# Patient Record
Sex: MALE | Race: WHITE | Employment: OTHER | URBAN - METROPOLITAN AREA
[De-identification: names, ages, dates, MRNs, and addresses within clinical notes are randomized per-mention and may not be internally consistent; named-entity substitution may affect disease eponyms.]

---

## 2017-02-09 ENCOUNTER — OFFICE VISIT (OUTPATIENT)
Dept: OTOLARYNGOLOGY | Facility: CLINIC | Age: 52
End: 2017-02-09

## 2017-02-09 ENCOUNTER — OFFICE VISIT (OUTPATIENT)
Dept: ORTHOPEDICS | Facility: CLINIC | Age: 52
End: 2017-02-09
Payer: COMMERCIAL

## 2017-02-09 ENCOUNTER — RADIANT APPOINTMENT (OUTPATIENT)
Dept: GENERAL RADIOLOGY | Facility: CLINIC | Age: 52
End: 2017-02-09
Attending: FAMILY MEDICINE
Payer: COMMERCIAL

## 2017-02-09 VITALS — BODY MASS INDEX: 35.93 KG/M2 | WEIGHT: 280 LBS

## 2017-02-09 VITALS — HEIGHT: 74 IN | WEIGHT: 285 LBS | BODY MASS INDEX: 36.57 KG/M2

## 2017-02-09 DIAGNOSIS — M79.644 BILATERAL THUMB PAIN: ICD-10-CM

## 2017-02-09 DIAGNOSIS — M79.644 BILATERAL THUMB PAIN: Primary | ICD-10-CM

## 2017-02-09 DIAGNOSIS — M79.645 BILATERAL THUMB PAIN: ICD-10-CM

## 2017-02-09 DIAGNOSIS — C76.0 ADENOID CYSTIC CARCINOMA OF HEAD AND NECK (H): Primary | ICD-10-CM

## 2017-02-09 DIAGNOSIS — M79.645 BILATERAL THUMB PAIN: Primary | ICD-10-CM

## 2017-02-09 PROCEDURE — 99213 OFFICE O/P EST LOW 20 MIN: CPT | Performed by: FAMILY MEDICINE

## 2017-02-09 PROCEDURE — 73130 X-RAY EXAM OF HAND: CPT | Mod: RT | Performed by: FAMILY MEDICINE

## 2017-02-09 ASSESSMENT — PAIN SCALES - GENERAL
PAINLEVEL: MILD PAIN (3)
PAINLEVEL: NO PAIN (0)

## 2017-02-09 NOTE — PROGRESS NOTES
[facility]   Clinic Visit s Feb 9, 2017    Subjective:  Luiz Ballesteros is a 52 year old male, right hand dominant, who is seen as self referral for evaluation of left thumb pain.    Symptoms began 6 month(s) ago.  Patient describes injury as shocking blast of pain with repetitive motions, as in using on cell phone.  He reports stabbing, radiating and beginning to radiate up the arm.  Right thumb is beginning to have the same symptoms,  pain that is located at the base of the thumb joint; radiation none.  Pain is 9/10 in maximal severity, and 3/10 currently.  Symptoms are generally worse with holding a knife, book, stirring a car; better with not using.  Other treatment so far has consisted of rest with minimal relief.  Associated symptoms: no distal numbness or tingling; denies swelling, redness, or warmth and weakness.  He denies history of related problems  none

## 2017-02-09 NOTE — PROGRESS NOTES
[facility]   Clinic Visit s Feb 9, 2017    Subjective:  Luiz Ballesteros is a 52 year old male, right hand dominant, who is seen as self referral for evaluation of left thumb pain.    Symptoms began 6 month(s) ago.  Patient describes injury as shocking blast of pain with repetitive motions, as in using on cell phone.  He reports stabbing, radiating and beginning to radiate up the arm.  Right thumb is beginning to have the same symptoms,  pain that is located at the base of the thumb joint; radiation none.  Pain is 9/10 in maximal severity, and 3/10 currently.  Symptoms are generally worse with holding a knife, book, starting a car; better with not using, holding a phone and using thumb from same hand on screen duplicates pain.  Other treatment so far has consisted of rest with minimal relief.  Associated symptoms: no distal numbness or tingling; denies swelling, redness, or warmth and weakness.  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 Provider to review       H/O  RD (retinal detachment) 08/18/2014     Priority: Medium     CARDIOVASCULAR SCREENING; LDL GOAL LESS THAN 160 03/06/2012     Priority: Medium     DEVONTE (obstructive sleep apnea) Moderate AHI 21 06/25/2008     Priority: Medium     A polysomnogram on 6/25/08 revealed the following highlights:    Total sleep time 455 minutes, sleep latency 7.5 minutes, REM sleep latency 168.5 minutes. Sleep efficiency was diminished at 78.2%. Sleep architecture was characterized by frequent sleep state changes and arousals. Snoring was documented as moderate to very loud. AHI 21.4, RDI 29.1 with oxygen saturations to 69%. Patient also had 27 central apneas. PLM index 21.8.          Family History   Problem Relation Age of Onset     Glaucoma No family hx of      Macular Degeneration No family hx of      Retinal detachment No family hx of      Amblyopia No family hx of      DIABETES No family hx of      CANCER       aunt- lymphoma       Social History     Social History     Marital Status:      Spouse Name: N/A     Number of Children: N/A     Years of Education: N/A     Social History Main Topics     Smoking status: Never Smoker      Smokeless tobacco: Never Used         Past Surgical History   Procedure Laterality Date     Eye surgery  2005     LE     Eye surgery       Colonoscopy  7/24/14     Colonoscopy  7/24/2014     Procedure: COLONOSCOPY;  Surgeon: Stephen Dempsey MD;  Location:  GI     Cataract iol, rt/lt  2005     RE CE/IOL/SV/PPV     Cataract iol, rt/lt  6/10/2009     LE     Strabismus surgery  2004     LE     Retinal reattachment  2004     retinal tear repair RE     Ent surgery       sinus surgery and septoplasty     Orthopedic surgery       elbow surgery     Optical tracking system endoscopic sinus surgery N/A 1/19/2015     Procedure: OPTICAL TRACKING SYSTEM ENDOSCOPIC SINUS SURGERY;  Surgeon: Raghav Faustin MD;  Location:  OR     Optical tracking system endoscopic endonasal surgery N/A 2/2/2015      "Procedure: OPTICAL TRACKING SYSTEM ENDOSCOPIC ENDONASAL SURGERY;  Surgeon: Raghav Faustin MD;  Location: [facility] OR       Review of Systems   Musculoskeletal: Positive for joint pain.   All other systems reviewed and are negative.        Physical Exam  Ht 6' 2\" (1.88 m)  Wt 285 lb (129.275 kg)  BMI 36.58 kg/m2  Constitutional:well-developed, well-nourished, and in no distress.   Cardiovascular: Intact distal pulses.    Neurological: alert. Gait Normal:   Gait, station, stance, and balance appear normal for age  Skin: Skin is warm and dry.   Psychiatric: Mood and affect normal.   Respiratory: unlabored, speaks in full sentences  Lymph: no LAD, no lymphangitis    Left Hand/Wrist Exam   Sensation: Normal    Tenderness   The patient is experiencing tenderness in the thenar mass.    Range of Motion   Wrist  Pronation:  Normal  Supination: Normal  Flexion:       Normal  Extension:  Normal  Hand  DIP Thumb: Normal  DIP Index:    Normal  DIP Middle:  Normal  DIP Ring:     Normal  DIP Little:     Normal  MP Thumb:  Normal  MP Index:     Normal  MP Middle:   Normal  MP Ring:      Normal  MP Little:      Normal  PIP Index:     Normal  PIP Middle:   Normal  PIP Ring:      Normal  PIP Little:      Normal    Muscle Strength   Wrist Extension:   5/5  Wrist Flexion:       5/5  :                      5/5    Tests   Phalen s Sign: n/t  Tinel s Sign (Medial Nerve): n/t  Finkelstein: Negative    Comments:  Nontender at thumb cmc, + pain with pinching    Right Hand/Wrist Exam   Sensation: Normal    Tenderness   The patient is experiencing tenderness in the thenar area, less than left.    Range of Motion   Wrist  Pronation:  Normal  Supination: Normal  Flexion:      Normal  Extension:  Normal  Hand  DIP Thumb: Normal  DIP Index:    Normal  DIP Middle:  Normal  DIP Ring:     Normal  DIP Little:     Normal  MP Thumb:  Normal  MP Index:     Normal  MP Middle:   Normal  MP Ring:      Normal  MP Little:      Normal  PIP " Index:    Normal  PIP Middle:  Normal  PIP Ring:     Normal  PIP Little:     Normal    Muscle Strength   Wrist Extension:   5/5  Wrist Flexion:       5/5  :                      5/5    Tests   Phalen s Sign: n/t  Tinel s Sign (Medial Nerve): n/t  Finkelstein: Negative    Comments:  Nontender at thumb cmc, no pain with pinching          X-ray images Ordered and independently reviewed by me in the office today with the patient. X-ray shows:   Recent Results (from the past 48 hour(s))   XR Hand Bilateral G/E 3 Views    Narrative    Normal alignment. No fractures, dislocation or lesions. No soft tissue   abnormalities.         ICD-10-CM    1. Bilateral thumb pain M79.645 XR Hand Bilateral G/E 3 Views    M79.644 NIC PT, HAND, AND CHIROPRACTIC REFERRAL     order for DME     CANCELED: DME REFERRAL     Thenar strain primarily from cell phone use. Advised altering phone habits, which he has done to some extent. Given comfort cool CMC brace, which improved symptoms. Will have him see hand therapy for additional ergonomic assistance and strengthening techniques.

## 2017-02-09 NOTE — NURSING NOTE
"Chief Complaint   Patient presents with     Hand Pain     left > right Thumb pain       Initial Ht 6' 2\" (1.88 m)  Wt 285 lb (129.275 kg)  BMI 36.58 kg/m2 Estimated body mass index is 36.58 kg/(m^2) as calculated from the following:    Height as of this encounter: 6' 2\" (1.88 m).    Weight as of this encounter: 285 lb (129.275 kg).  Medication Reconciliation: complete   Minerva Jean ATC      "

## 2017-02-09 NOTE — Clinical Note
Here's an update on your patient, Luiz Ballesteros. Thank you for allowing me at [facility] to be involved in the care of your patient. Please feel free to reach out to me on Service[employer], Epic Staff Message, or by phone at [phone].   Horacio Hobbs MD [facility] [address] , [address] [place] [address] Phone: [phone] Fax: [phone]

## 2017-02-09 NOTE — LETTER
2/9/2017       RE: Luiz Ballesteros  [address] [place] [address]     Dear Colleague,    Thank you for referring your patient, Luiz Ballesteros, to the [facility] EAR NOSE AND THROAT at [facility]. Please see a copy of my visit note below.    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      TREATMENT:  The patient underwent transnasal endoscopic resection on 02/02/2015 followed by postoperative adjuvant proton beam radiation therapy and IMRT at [facility].  The patient finished treatment on 05/20/2015.      HISTORY OF PRESENT ILLNESS:  Mr. Ballesteros is here for sinonasal debridement.  As usual, he has been having some sinonasal congestion and obstruction.      PROCEDURE:  I used a 0-degree nasal endoscope, and I removed abundant sinonasal crusting using suction as well as Yuri forceps.  He tolerated the procedure very well.      PLAN:  Follow up p.r.n.         Again, thank you for allowing me to participate in the care of your patient.      Sincerely,    Raghav Faustin MD

## 2017-02-09 NOTE — PROGRESS NOTES
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      TREATMENT:  The patient underwent transnasal endoscopic resection on 02/02/2015 followed by postoperative adjuvant proton beam radiation therapy and IMRT at [facility].  The patient finished treatment on 05/20/2015.      HISTORY OF PRESENT ILLNESS:  Mr. Ballesteros is here for sinonasal debridement.  As usual, he has been having some sinonasal congestion and obstruction.      PROCEDURE:  I used a 0-degree nasal endoscope, and I removed abundant sinonasal crusting using suction as well as Yuri forceps.  He tolerated the procedure very well.      PLAN:  Follow up p.r.n.

## 2017-02-09 NOTE — MR AVS SNAPSHOT
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    -  1       Follow-ups after your visit        Your next 10 appointments already scheduled     Mar 14, 2017 10:00 AM CDT   VISUAL FIELD with [facility] EYE VISUAL FIELD   Eye Clinic ([facility])    [address]  [address]  [facility] [address] [place] [address]   [phone]            Mar 14, 2017 10:30 AM CDT   RETURN GLAUCOMA with Jennyfer Mccall MD   Eye Clinic ([facility])    [address]  [address]  [facility] [address] [place] [address]   [phone]            Apr 20, 2017  1:20 PM CDT   (Arrive by 1:05 PM)   Return Visit with Raghav Faustin MD   [facility] Ear Nose and Throat ([facility])    [address]  [address]th [facility] [address] [phone]            Apr 20, 2017  2:20 PM CDT   (Arrive by 2:05 PM)   Return Visit with Raghav Faustin MD   [facility] Ear Nose and Throat ([facility])    [address]  [address]th [facility] [id number] [phone]            May 15, 2017  1:00 PM CDT   RETURN RETINA with Lindsey Perez MD   Eye Clinic ([facility])    [address]  [address]  [facility] [address] [place] [address]   [phone]            Aug 10, 2017 10:00 AM CDT   (Arrive by 9:45 AM)   MR ORBIT FACE NECK W/O & W CONTRAST with [address] [facility] ([facility])    [address]  1st Floor  [facility] [id number] [phone]           Take your medicines as usual, unless your doctor tells you  not to. Bring a list of your current medicines to your exam (including vitamins, minerals and over-the-counter drugs).  You will be given intravenous contrast for this exam. To prepare:   The day before your exam, drink extra fluids at least six 8-ounce glasses (unless your doctor tells you to restrict your fluids).   Have a blood test (creatinine test) within 30 days of your exam. Go to your clinic or Diagnostic Imaging Department for this test.  The MRI machine uses a strong magnet. Please wear clothes without metal (snaps, zippers). A sweatsuit works well, or we may give you a hospital gown.  Please remove any body piercings and hair extensions before you arrive. You will also remove watches, jewelry, hairpins, wallets, dentures, partial dental plates and hearing aids. You may wear contact lenses, and you may be able to wear your rings. We have a safe place to keep your personal items, but it is safer to leave them at home.   **IMPORTANT** THE INSTRUCTIONS BELOW ARE ONLY FOR THOSE PATIENTS WHO HAVE BEEN TOLD THEY WILL RECEIVE SEDATION OR GENERAL ANESTHESIA DURING THEIR MRI PROCEDURE:  IF YOU WILL RECEIVE SEDATION (take medicine to help you relax during your exam):   You must get the medicine from your doctor before you arrive. Bring the medicine to the exam. Do not take it at home.   Arrive one hour early. Bring someone who can take you home after the test. Your medicine will make you sleepy. After the exam, you may not drive, take a bus or take a taxi by yourself.   No eating 8 hours before your exam. You may have clear liquids up until 4 hours before your exam. (Clear liquids include water, clear tea, black coffee and fruit juice without pulp.)  IF YOU WILL RECEIVE ANESTHESIA (be asleep for your exam):   Arrive 1 1/2 hours early. Bring someone who can take you home after the test. You may not drive, take a bus or take a taxi by yourself.   No eating 8 hours before your exam. You may have clear liquids up until  4 hours before your exam. (Clear liquids include water, clear tea, black coffee and fruit juice without pulp.)  Please call the Imaging Department at your exam site with any questions.              Who to contact     Please call your clinic at [phone] to:    Ask questions about your health    Make or cancel appointments    Discuss your medicines    Learn about your test results    Speak to your doctor   If you have compliments or concerns about an experience at your clinic, or if you wish to file a complaint, please contact [facility] Patient Relations at [phone] or email us at [email or web address]         Additional Information About Your Visit        Venaxis Information     Venaxis gives you secure access to your electronic health record. If you see a primary care provider, you can also send messages to your care team and make appointments. If you have questions, please call your primary care clinic.  If you do not have a primary care provider, please call [phone] and they will assist you.      Venaxis is an electronic gateway that provides easy, online access to your medical records. With Venaxis, you can request a clinic appointment, read your test results, renew a prescription or communicate with your care team.     To access your existing account, please contact your [facility] or call [phone] for assistance.        Care EveryWhere ID     This is your Care EveryWhere ID. This could be used by other organizations to access your [facility] medical records  [id number]        Your Vitals Were     BMI (Body Mass Index)                   35.95 kg/m2            Blood Pressure from Last 3 Encounters:   12/27/16 128/77   11/16/16 119/60   10/24/16 108/72    Weight from Last 3 Encounters:   02/09/17 127 kg (280 lb)   02/09/17 129.3 kg (285 lb)   12/27/16 134.7 kg (296 lb 15.4 oz)              We Performed the Following      NASAL/SINUS SCOPE WITH BIOPSY/POLYPECT/DEBRIDE,ENT          Today's Medication Changes          These changes are accurate as of: 2/9/17 11:59 PM.  If you have any questions, ask your nurse or doctor.               These medicines have changed or have updated prescriptions.        Dose/Directions    * order for DME   This may have changed:  Another medication with the same name was added. Make sure you understand how and when to take each.   Changed by:  Elizabeth Hawkins MD        Use your CPAP device as directed by your provider.   Refills:  0       * order for DME   This may have changed:  Another medication with the same name was added. Make sure you understand how and when to take each.   Used for:  Achilles tendinitis of both lower extremities, Shin splints, unspecified laterality, initial encounter, Ankle instability, unspecified laterality   Changed by:  Horacio Hobbs MD        Equipment being ordered: double strap ankle brace- XL for right and left ankle   Quantity:  2 Device   Refills:  0       * order for DME   This may have changed:  You were already taking a medication with the same name, and this prescription was added. Make sure you understand how and when to take each.   Used for:  Bilateral thumb pain   Changed by:  Horacio Hobbs MD        Equipment being ordered: CMC brace, LEFT, XL, $28   Quantity:  1 Device   Refills:  0       * Notice:  This list has 3 medication(s) that are the same as other medications prescribed for you. Read the directions carefully, and ask your doctor or other care provider to review them with you.         Where to get your medicines      Some of these will need a paper prescription and others can be bought over the counter.  Ask your nurse if you have questions.     Bring a paper prescription for each of these medications     order for DME                Primary Care Provider Office Phone # Fax #    Christopher Gaytan [phone]  [phone]       [facility] [address] [place] [address]  [facility] [address]        Thank you!     Thank you for choosing [facility] EAR NOSE AND THROAT  for your care. Our goal is always to provide you with excellent care. Hearing back from our patients is one way we can continue to improve our services. Please take a few minutes to complete the written survey that you may receive in the mail after your visit with us. Thank you!             Your Updated Medication List - Protect others around you: Learn how to safely use, store and throw away your medicines at www.disposemymeds.org.          This list is accurate as of: 2/9/17 11:59 PM.  Always use your most recent med list.                   Brand Name Dispense Instructions for use    amLODIPine 10 MG tablet    NORVASC    90 tablet    Take 1 tablet (10 mg) by mouth daily       busPIRone 10 MG tablet    BUSPAR         latanoprost 0.005 % ophthalmic solution    XALATAN    1 Bottle    Place 1 drop into both eyes At Bedtime       lisinopril 40 MG tablet    PRINIVIL/ZESTRIL    90 tablet    Take 1 tablet (40 mg) by mouth daily       methylphenidate 10 MG tablet    RITALIN         ofloxacin 0.3 % ophthalmic solution    OCUFLOX         ofloxacin 0.3 % otic solution    FLOXIN    10 mL    Instill into affected ear/s:  3gtts BID x 7 days       omeprazole 40 MG capsule    priLOSEC    90 capsule    Take 1 capsule (40 mg) by mouth daily       * order for DME      Use your CPAP device as directed by your provider.       * order for DME     2 Device    Equipment being ordered: double strap ankle brace- XL for right and left ankle       * order for DME     1 Device    Equipment being ordered: CMC brace, LEFT, XL, $28       * timolol 0.5 % ophthalmic solution    TIMOPTIC    1 Bottle    Place 1 drop into both eyes daily       * timolol 0.5 % ophthalmic gel-form    TIMOPTIC-XE    5 mL    Place 1 drop into both eyes daily       traZODone 50 MG tablet    DESYREL         *  venlafaxine 150 MG 24 hr capsule    EFFEXOR-XR         * venlafaxine 75 MG 24 hr capsule    EFFEXOR-XR         * Notice:  This list has 7 medication(s) that are the same as other medications prescribed for you. Read the directions carefully, and ask your doctor or other care provider to review them with you.

## 2017-02-09 NOTE — NURSING NOTE
Chief Complaint   Patient presents with     RECHECK     Nasal Cleaning     Shanna Arora Medical Assistant

## 2017-02-14 ENCOUNTER — THERAPY VISIT (OUTPATIENT)
Dept: OCCUPATIONAL THERAPY | Facility: CLINIC | Age: 52
End: 2017-02-14
Payer: COMMERCIAL

## 2017-02-14 DIAGNOSIS — M18.9 CMC ARTHRITIS, THUMB, DEGENERATIVE: Primary | ICD-10-CM

## 2017-02-14 DIAGNOSIS — M79.645 THUMB PAIN, LEFT: ICD-10-CM

## 2017-02-14 PROCEDURE — 97535 SELF CARE MNGMENT TRAINING: CPT | Mod: GO | Performed by: OCCUPATIONAL THERAPIST

## 2017-02-14 PROCEDURE — 97165 OT EVAL LOW COMPLEX 30 MIN: CPT | Mod: GO | Performed by: OCCUPATIONAL THERAPIST

## 2017-02-14 PROCEDURE — 97110 THERAPEUTIC EXERCISES: CPT | Mod: GO | Performed by: OCCUPATIONAL THERAPIST

## 2017-02-14 NOTE — PROGRESS NOTES
Hand Therapy Initial Evaluation    Current Date:  2/14/2017    Diagnosis: L thumb CMC and thenar pain   DOI: August 2016    Subjective:  Luiz Ballesteros is a 52 year old right hand dominant male.    Patient reports symptoms of pain of the left thumb which occurred around the time he was making a cigar box guitar, since then random things trigger it holding book holding steering wheel and phone, opening a door. . Since onset symptoms are Gradually getting worse.  Special tests:  x-ray negative for CMC OA .  Previous treatment: comfort cool, rest .    General health as reported by patient is excellent.  Pertinent medical history includes:cancer ACC, high blood pressure, sleep disorder/apnea  Medical allergies:see chart, late. Surgical history: cancer:B  tennis elbow , orthopedic  Medication history: none.    Current occupation is self employed medical and legal transcription,  Much computer and cell phone usage.  Currently working in normal job without restrictionsNone  Job Tasks: computer work and cell phone   Barriers include:none  Prior functional level: no limitations    Additional Occupational Profile Information (patterns of daily living, interests, values and needs): turning door knobs, holding things, documents books etc     Functional Outcome Measure:  Upper Extremity Functional Index Score:  SCORE:   Column Totals: /80: 57   (A lower score indicates greater disability.)    Objective:  Pain Level Report  VAS(0-10) 2/14/2017   At Rest: 0   With Use: 8     Report of Pain:  Location:  thumb  Pain Quality:  Sharp and Shooting  Frequency: intermittent    Pain is worst:  daytime  Exacerbated by:  Holding   Relieved by:  rest  Progression:  Gradually better.  ROM:wnl      Thumb Observation/Appearance:  Key: + = present/ - = not observed    2/14/2017   Shoulder deformity present over CMC R:+  L:+   Volar subluxation present R:-  L:-   Edema over the CMC joint R:-  L:+ mild    Noted collapse of MP into  hyperextension during pinch R:  L:   Tenderness at CMC R:  L:      Provocative Tests:  Pain Report:  - none    + mild    ++ moderate    +++ severe     2/14/2017   CMC Grind test R:-  L:+   CMC Joint line/pain R:+  L:++   CMC AP joint laxity R:-  L: -   FInkelstein's R:-  L:-   Crepitus present R:-  L:-         Strength:   (Measured in pounds)  Pain Report:  - none    + mild    ++ moderate    +++ severe    2/14/2017    Trials Right  Left    1  2  3 100  110  104 105  105  80   Average: 104 97     3 pt  Pinch 2/14/2017    Trials Right Left    1  2  3 18  18  20 20  20  20   Average: 19 20     lateral Pinch 2/14/2017    Trials Right  Left    1  2  3 24  21  21 23  21  20   Average: 23 21     Assessment:  Patient presents with symptoms consistent with diagnosis of CMC thumb arthrosis and pain , with conservative intervention    Patient s limitations or Problem List includes: Pain, Decreased ROM/motion, weakness, decreased stability of the CMC joint, decreased  and pinch strength of the thumb which interferes with patients ability to perform  Self Care Tasks (dressing), Recreational Activities and Household Chores as compared to previous level of function.    Rehab Potential: Good- Return to full activity, some limitations.    Patient will benefit from skilled Occupational Therapy to increased ROM, flexibility, pinch strength, and stability of the thumb and decrease pain to return to previous activity level and resume normal daily tasks and to reach their rehab potential.    Barriers to Learning:  No barrier    Communication Issues: Patient appears to be able to clearly communicate and understand verbal and written communication and follow directions correctly.    Assessment of Occupational Performance:  1-3 Performance Deficits  Identified Performance Deficits: dressing, hygiene and grooming, home establishment and management and leisure activities      Clinical Decision Making (Complexity): Low  complexity    Treatment Explanations:  The following has been discussed with the patient,  Rx ordered/plan of care  Anticipated outcomes  Possible risks and side effects    Plan:  Frequency:  1 X week, once daily  Duration:  for 6 weeks    Treatment Plan:  Modalities:  Paraffin  Therapeutic Exercise: AROM, Isometrics, and Stabilization exercises of the Thumb CMC, including active and resisted abduction, 1st DI strengthening  Manual Techniques: Joint Mobilization or reseating of the trapezium, self MFR to thumb adductor with clip  Orthosis fabrication:  Hand based Thumb Spica, Custom neoprene support   Education: Anatomy of CMC, joint protection principles, adaptive equipment as needed    Discharge Plan:  Achieve all LTG  [place] in home treatment program.  Reach maximal therapeutic benefit.    Home Program:  1st web release with clip  Adductor stretch  CMC neoprene cool comfort splint during the day with ADL s per symptoms, make sure strap is cradling CMCJ   Incorporate joint protection into daily functional activities  Adaptive equipment as needed.    Next Visit:  Custom L hand CMC support  orthosis  Warmth  Place and hold pinch  Thumb Stabilization Program with hard  C  and index abd  Hand based Thumb Spica orthosis night/sleeping and per symptoms during the day

## 2017-02-14 NOTE — MR AVS SNAPSHOT
After Visit Summary   2/14/2017    Luiz Ballesteros    MRN: [id number]           Patient Information     Date Of Birth          1965        Visit Information        Provider Department      2/14/2017 1:30 PM Noris Carrington OT [facility]        Today's Diagnoses     CMC arthritis, thumb, degenerative    -  1    Thumb pain, left           Follow-ups after your visit        Your next 10 appointments already scheduled     Feb 21, 2017 10:00 AM CST   NIC Hand with [facility] ([facility] [place])    [address] [address]   [phone]            Mar 14, 2017 10:00 AM CDT   VISUAL FIELD with [facility] EYE VISUAL FIELD   Eye Clinic ([facility])    [address]   [facility] [address] [place] [address]   [phone]            Mar 14, 2017 10:30 AM CDT   RETURN GLAUCOMA with Jennyfer Mccall MD   Eye Clinic ([facility])    [address]  [address]   [facility] [address] [place] [address]   [phone]            Apr 20, 2017  1:20 PM CDT   (Arrive by 1:05 PM)   Return Visit with Raghav Faustin MD   [facility] Ear Nose and Throat ([facility])    [address] [address] [phone]            Apr 20, 2017  2:20 PM CDT   (Arrive by 2:05 PM)   Return Visit with Raghav Faustin MD   [facility] Nose [facility]roat ([facility] [place])    [address] [address] [phone]            May 15, 2017  1:00 PM CDT   RETURN RETINA with Lindsey Perez MD   Eye Clinic ([facility])    [address]  [address]   [address] [address] [place] [address]   [phone]            Aug 10, 2017 10:00 AM CDT   (Arrive by 9:45 AM)   MR ORBIT FACE NECK W/O & W CONTRAST with MR1     [facility] ([facility] [place])    [address] [facility]  1st Floor  [facility] [address] [phone]           Take your medicines as usual, unless your doctor tells you not to. Bring a list of your current medicines to your exam (including vitamins, minerals and over-the-counter drugs).  You will be given intravenous contrast for this exam. To prepare:   The day before your exam, drink extra fluids at least six 8-ounce glasses (unless your doctor tells you to restrict your fluids).   Have a blood test (creatinine test) within 30 days of your exam. Go to your clinic or Diagnostic Imaging Department for this test.  The MRI machine uses a strong magnet. Please wear clothes without metal (snaps, zippers). A sweatsuit works well, or we may give you a hospital gown.  Please remove any body piercings and hair extensions before you arrive. You will also remove watches, jewelry, hairpins, wallets, dentures, partial dental plates and hearing aids. You may wear contact lenses, and you may be able to wear your rings. We have a safe place to keep your personal items, but it is safer to leave them at home.   **IMPORTANT** THE INSTRUCTIONS BELOW ARE ONLY FOR THOSE PATIENTS WHO HAVE BEEN TOLD THEY WILL RECEIVE SEDATION OR GENERAL ANESTHESIA DURING THEIR MRI PROCEDURE:  IF YOU WILL RECEIVE SEDATION (take medicine to help you relax during your exam):   You must get the medicine from your doctor before you arrive. Bring the medicine to the exam. Do not take it at home.   Arrive one hour early. Bring someone who can take you home after the test. Your medicine will make you sleepy. After the exam, you may not drive, take a bus or take a taxi by yourself.   No eating 8 hours before your exam. You may have clear liquids up until 4 hours before your exam. (Clear liquids include water, clear tea, black coffee and fruit juice without pulp.)  IF YOU WILL RECEIVE ANESTHESIA (be asleep for your exam):    Arrive 1 1/2 hours early. Bring someone who can take you home after the test. You may not drive, take a bus or take a taxi by yourself.   No eating 8 hours before your exam. You may have clear liquids up until 4 hours before your exam. (Clear liquids include water, clear tea, black coffee and fruit juice without pulp.)  Please call the Imaging Department at your exam site with any questions.              Who to contact     If you have questions or need follow up information about today's clinic visit or your schedule please contact [facility] directly at [phone].  Normal or non-critical lab and imaging results will be communicated to you by MobileVedahart, letter or phone within 4 business days after the clinic has received the results. If you do not hear from us within 7 days, please contact the clinic through [email or web address] or phone. If you have a critical or abnormal lab result, we will notify you by phone as soon as possible.  Submit refill requests through [facility] or call your pharmacy and they will forward the refill request to us. Please allow 3 business days for your refill to be completed.          Additional Information About Your Visit        MyChart Information     [facility] gives you secure access to your electronic health record. If you see a primary care provider, you can also send messages to your care team and make appointments. If you have questions, please call your primary care clinic.  If you do not have a primary care provider, please call [phone] and they will assist you.        Care EveryWhere ID     This is your Care EveryWhere ID. This could be used by other organizations to access your [facility] medical records  [id number]         Blood Pressure from Last 3 Encounters:   12/27/16 128/77   11/16/16 119/60   10/24/16 108/72    Weight from Last 3 Encounters:   02/09/17 127 kg (280 lb)   02/09/17 129.3 kg (285 lb)   12/27/16 134.7 kg (296 lb 15.4 oz)              We Performed the  Following     HC OT EVAL, LOW COMPLEXITY     SELF CARE MNGMENT TRAINING     THERAPEUTIC EXERCISES        Primary Care Provider Office Phone # Fax #    Christopher Gaytan [phone]       [facility] [address] [place] [id number]        Thank you!     Thank you for choosing [facility]  for your care. Our goal is always to provide you with excellent care. Hearing back from our patients is one way we can continue to improve our services. Please take a few minutes to complete the written survey that you may receive in the mail after your visit with us. Thank you!             Your Updated Medication List - Protect others around you: Learn how to safely use, store and throw away your medicines at www.disposemymeds.org.          This list is accurate as of: 2/14/17  8:46 PM.  Always use your most recent med list.                   Brand Name Dispense Instructions for use    amLODIPine 10 MG tablet    NORVASC    90 tablet    Take 1 tablet (10 mg) by mouth daily       busPIRone 10 MG tablet    BUSPAR         latanoprost 0.005 % ophthalmic solution    XALATAN    1 Bottle    Place 1 drop into both eyes At Bedtime       lisinopril 40 MG tablet    PRINIVIL/ZESTRIL    90 tablet    Take 1 tablet (40 mg) by mouth daily       methylphenidate 10 MG tablet    RITALIN         ofloxacin 0.3 % ophthalmic solution    OCUFLOX         ofloxacin 0.3 % otic solution    FLOXIN    10 mL    Instill into affected ear/s:  3gtts BID x 7 days       omeprazole 40 MG capsule    priLOSEC    90 capsule    Take 1 capsule (40 mg) by mouth daily       * order for DME      Use your CPAP device as directed by your provider.       * order for DME     2 Device    Equipment being ordered: double strap ankle brace- XL for right and left ankle       * order for DME     1 Device    Equipment being ordered: CMC brace, LEFT, XL, $28       * timolol 0.5 % ophthalmic solution    TIMOPTIC    1 Bottle    Place 1 drop  into both eyes daily       * timolol 0.5 % ophthalmic gel-form    TIMOPTIC-XE    5 mL    Place 1 drop into both eyes daily       traZODone 50 MG tablet    DESYREL         * venlafaxine 150 MG 24 hr capsule    EFFEXOR-XR         * venlafaxine 75 MG 24 hr capsule    EFFEXOR-XR         * Notice:  This list has 7 medication(s) that are the same as other medications prescribed for you. Read the directions carefully, and ask your doctor or other care provider to review them with you.

## 2017-02-21 ENCOUNTER — THERAPY VISIT (OUTPATIENT)
Dept: OCCUPATIONAL THERAPY | Facility: CLINIC | Age: 52
End: 2017-02-21
Payer: COMMERCIAL

## 2017-02-21 DIAGNOSIS — M79.645 THUMB PAIN, LEFT: ICD-10-CM

## 2017-02-21 DIAGNOSIS — M18.9 CMC ARTHRITIS, THUMB, DEGENERATIVE: ICD-10-CM

## 2017-02-21 PROCEDURE — 29130 APPL FINGER SPLINT STATIC: CPT | Mod: GO | Performed by: OCCUPATIONAL THERAPIST

## 2017-02-21 NOTE — PROGRESS NOTES
SOAP note objective information for 2/21/2017.    Diagnosis: L thumb CMC and thenar pain   DOI: August 2016    Luiz Ballesteros is a 52 year old right hand dominant male.  Patient reports symptoms of pain of the left thumb which occurred around the time he was making a cigar box guitar, since then random things trigger it holding book holding steering wheel and phone, opening a door. . Since onset symptoms are Gradually getting worse.  Special tests:  x-ray negative for CMC OA .  Previous treatment: comfort cool, rest .    General health as reported by patient is excellent.  Pertinent medical history includes:cancer ACC, high blood pressure, sleep disorder/apnea  Medical allergies:see chart, late. Surgical history: cancer:B  tennis elbow , orthopedic  Medication history: none.    Current occupation is self employed medical and legal transcription,  Much computer and cell phone usage.  Currently working in normal job without restrictions  Job Tasks: computer work and cell phone   Barriers include:none  Prior functional level:no limitations    Additional Occupational Profile Information (patterns of daily living, interests, values and needs): turning door knobs, holding things, documents books etc     Functional Outcome Measure:  Upper Extremity Functional Index Score:  SCORE:   Column Totals: /80: 57   (A lower score indicates greater disability.)    Objective:  Pain Level Report  VAS(0-10) 2/14/2017 2/21   At Rest: 0 1-2   With Use: 8 8-9     Report of Pain:  Location:  thumb  Pain Quality:  Sharp and Shooting  Frequency: intermittent    Pain is worst:  daytime  Exacerbated by:  Holding   Relieved by:  rest  Progression:  Gradually better.  ROM:wnl    Thumb Observation/Appearance:  Key: + = present/ - = not observed    2/14/2017   Shoulder deformity present over CMC R:+  L:+   Volar subluxation present R:-  L:-   Edema over the CMC joint R:-  L:+ mild    Noted collapse of MP into hyperextension during pinch  R:  L:   Tenderness at CMC R:  L:      Provocative Tests:  Pain Report:  - none    + mild    ++ moderate    +++ severe     2/14/2017   CMC Grind test R:-  L:+   CMC Joint line/pain R:+  L:++   CMC AP joint laxity R:-  L: -   FInkelstein's R:-  L:-   Crepitus present R:-  L:-         Strength:   (Measured in pounds)  Pain Report:  - none    + mild    ++ moderate    +++ severe    2/14/2017    Trials Right  Left    1  2  3 100  110  104 105  105  80   Average: 104 97     3 pt  Pinch 2/14/2017    Trials Right Left    1  2  3 18  18  20 20  20  20   Average: 19 20     lateral Pinch 2/14/2017    Trials Right  Left    1  2  3 24  21  21 23  21  20   Average: 23 21     Plan:  Frequency:  1 X week, once daily  Duration:  for 6 weeks    Treatment Plan:  Modalities:  Paraffin  Therapeutic Exercise: AROM, Isometrics, and Stabilization exercises of the Thumb CMC, including active and resisted abduction, 1st DI strengthening  Manual Techniques: Joint Mobilization or reseating of the trapezium, self MFR to thumb adductor with clip  Orthosis fabrication:  Hand based Thumb Spica, Custom neoprene support   Education: Anatomy of CMC, joint protection principles, adaptive equipment as needed    Home Program:  1st web release with clip  Adductor stretch  CMC neoprene cool comfort splint during the day with ADL s per symptoms, make sure strap is cradling CMCJ   Incorporate joint protection into daily functional activities  Adaptive equipment as needed.  Custom L hand CMC support  Orthosis    Next Visit:  How was orthosis:? Vs comfort cool?  Warmth  Place and hold pinch  Thumb Stabilization Program with hard  C  and index abd

## 2017-02-21 NOTE — MR AVS SNAPSHOT
After Visit Summary   2/21/2017    Luiz Ballesteros    MRN: [id number]           Patient Information     Date Of Birth          1965        Visit Information        Provider Department      2/21/2017 10:00 AM Noris Carrington OT [facility]        Today's Diagnoses     CMC arthritis, thumb, degenerative        Thumb pain, left           Follow-ups after your visit        Your next 10 appointments already scheduled     Feb 28, 2017 10:00 AM CST   NIC Hand with [facility] ([facility])    [address] [address]   [phone]            Mar 14, 2017 10:00 AM CDT   VISUAL FIELD with [facility] EYE VISUAL FIELD   Eye Clinic ([facility])    [address]  [address] Clin [address] [place] [address]   [phone]            Mar 14, 2017 10:30 AM CDT   RETURN GLAUCOMA with Jennyfer Mccall MD   Eye Clinic ([facility])    [address]  [address] Clin [address] [place] [address]   [phone]            Apr 20, 2017  1:20 PM CDT   (Arrive by 1:05 PM)   Return Visit with Raghav Faustin MD   [facility] ([facility])    [address] [place] [id number] [phone]            Apr 20, 2017  2:20 PM CDT   (Arrive by 2:05 PM)   Return Visit with Raghav Faustin MD   [facility] ([facility])    [address] [place] [id number] [phone]            May 15, 2017  1:00 PM CDT   RETURN RETINA with Lindsey Perez MD   Eye Clinic ([facility])    [address]  [address]   [facility] Clin [address] [place] [address]   [phone]            Aug 10, 2017 10:00 AM CDT   (Arrive by 9:45 AM)   MR ORBIT FACE NECK W/O & W CONTRAST with UCMR1   M  [facility] ([facility])    [address] [facility]  1st Floor  [facility] [address] [phone]           Take your medicines as usual, unless your doctor tells you not to. Bring a list of your current medicines to your exam (including vitamins, minerals and over-the-counter drugs).  You will be given intravenous contrast for this exam. To prepare:   The day before your exam, drink extra fluids at least six 8-ounce glasses (unless your doctor tells you to restrict your fluids).   Have a blood test (creatinine test) within 30 days of your exam. Go to your clinic or Diagnostic Imaging Department for this test.  The MRI machine uses a strong magnet. Please wear clothes without metal (snaps, zippers). A sweatsuit works well, or we may give you a hospital gown.  Please remove any body piercings and hair extensions before you arrive. You will also remove watches, jewelry, hairpins, wallets, dentures, partial dental plates and hearing aids. You may wear contact lenses, and you may be able to wear your rings. We have a safe place to keep your personal items, but it is safer to leave them at home.   **IMPORTANT** THE INSTRUCTIONS BELOW ARE ONLY FOR THOSE PATIENTS WHO HAVE BEEN TOLD THEY WILL RECEIVE SEDATION OR GENERAL ANESTHESIA DURING THEIR MRI PROCEDURE:  IF YOU WILL RECEIVE SEDATION (take medicine to help you relax during your exam):   You must get the medicine from your doctor before you arrive. Bring the medicine to the exam. Do not take it at home.   Arrive one hour early. Bring someone who can take you home after the test. Your medicine will make you sleepy. After the exam, you may not drive, take a bus or take a taxi by yourself.   No eating 8 hours before your exam. You may have clear liquids up until 4 hours before your exam. (Clear liquids include water, clear tea, black coffee and fruit juice without pulp.)  IF YOU WILL RECEIVE ANESTHESIA (be asleep for your exam):    Arrive 1 1/2 hours early. Bring someone who can take you home after the test. You may not drive, take a bus or take a taxi by yourself.   No eating 8 hours before your exam. You may have clear liquids up until 4 hours before your exam. (Clear liquids include water, clear tea, black coffee and fruit juice without pulp.)  Please call the Imaging Department at your exam site with any questions.              Who to contact     If you have questions or need follow up information about today's clinic visit or your schedule please contact "[employer] directly at [phone].  Normal or non-critical lab and imaging results will be communicated to you by O-filmhart, letter or phone within 4 business days after the clinic has received the results. If you do not hear from us within 7 days, please contact the clinic through [employer] or phone. If you have a critical or abnormal lab result, we will notify you by phone as soon as possible.  Submit refill requests through [employer] or call your pharmacy and they will forward the refill request to us. Please allow 3 business days for your refill to be completed.          Additional Information About Your Visit        MyChart Information     [employer] gives you secure access to your electronic health record. If you see a primary care provider, you can also send messages to your care team and make appointments. If you have questions, please call your primary care clinic.  If you do not have a primary care provider, please call [phone] and they will assist you.        Care EveryWhere ID     This is your Care EveryWhere ID. This could be used by other organizations to access your [place] medical records  [id number]         Blood Pressure from Last 3 Encounters:   12/27/16 128/77   11/16/16 119/60   10/24/16 108/72    Weight from Last 3 Encounters:   02/09/17 127 kg (280 lb)   02/09/17 129.3 kg (285 lb)   12/27/16 134.7 kg (296 lb 15.4 oz)              We Performed the  Following     APPLY FINGER SPLINT STATIC        Primary Care Provider Office Phone # Fax #    Christopher Gaytan [phone]       [facility] [address] [place] [address]        Thank you!     Thank you for choosing [facility]  for your care. Our goal is always to provide you with excellent care. Hearing back from our patients is one way we can continue to improve our services. Please take a few minutes to complete the written survey that you may receive in the mail after your visit with us. Thank you!             Your Updated Medication List - Protect others around you: Learn how to safely use, store and throw away your medicines at www.disposemymeds.org.          This list is accurate as of: 2/21/17 10:10 PM.  Always use your most recent med list.                   Brand Name Dispense Instructions for use    amLODIPine 10 MG tablet    NORVASC    90 tablet    Take 1 tablet (10 mg) by mouth daily       busPIRone 10 MG tablet    BUSPAR         latanoprost 0.005 % ophthalmic solution    XALATAN    1 Bottle    Place 1 drop into both eyes At Bedtime       lisinopril 40 MG tablet    PRINIVIL/ZESTRIL    90 tablet    Take 1 tablet (40 mg) by mouth daily       methylphenidate 10 MG tablet    RITALIN         ofloxacin 0.3 % ophthalmic solution    OCUFLOX         ofloxacin 0.3 % otic solution    FLOXIN    10 mL    Instill into affected ear/s:  3gtts BID x 7 days       omeprazole 40 MG capsule    priLOSEC    90 capsule    Take 1 capsule (40 mg) by mouth daily       * order for DME      Use your CPAP device as directed by your provider.       * order for DME     2 Device    Equipment being ordered: double strap ankle brace- XL for right and left ankle       * order for DME     1 Device    Equipment being ordered: CMC brace, LEFT, XL, $28       * timolol 0.5 % ophthalmic solution    TIMOPTIC    1 Bottle    Place 1 drop into both eyes daily       * timolol 0.5 % ophthalmic  gel-form    TIMOPTIC-XE    5 mL    Place 1 drop into both eyes daily       traZODone 50 MG tablet    DESYREL         * venlafaxine 150 MG 24 hr capsule    EFFEXOR-XR         * venlafaxine 75 MG 24 hr capsule    EFFEXOR-XR         * Notice:  This list has 7 medication(s) that are the same as other medications prescribed for you. Read the directions carefully, and ask your doctor or other care provider to review them with you.

## 2017-02-28 ENCOUNTER — THERAPY VISIT (OUTPATIENT)
Dept: OCCUPATIONAL THERAPY | Facility: CLINIC | Age: 52
End: 2017-02-28
Payer: COMMERCIAL

## 2017-02-28 DIAGNOSIS — M79.645 THUMB PAIN, LEFT: ICD-10-CM

## 2017-02-28 PROCEDURE — 97140 MANUAL THERAPY 1/> REGIONS: CPT | Mod: GO | Performed by: OCCUPATIONAL THERAPIST

## 2017-02-28 PROCEDURE — 97110 THERAPEUTIC EXERCISES: CPT | Mod: GO | Performed by: OCCUPATIONAL THERAPIST

## 2017-02-28 NOTE — MR AVS SNAPSHOT
After Visit Summary   2/28/2017    Luiz Ballesteros    MRN: [id number]           Patient Information     Date Of Birth          1965        Visit Information        Provider Department      2/28/2017 10:00 AM Noris Carrington OT [facility]        Today's Diagnoses     Thumb pain, left           Follow-ups after your visit        Your next 10 appointments already scheduled     Mar 14, 2017 10:00 AM CDT   VISUAL FIELD with [facility] EYE VISUAL FIELD   Eye Clinic ([facility])    [address]  [address] [address]   [phone]            Mar 14, 2017 10:30 AM CDT   RETURN GLAUCOMA with Jennyfer Mccall MD   Eye Clinic ([facility])    [address]  [address] [address]   [phone]            Mar 21, 2017 10:00 AM CDT   NIC Hand with [facility] ([facility])    [address] [facility]  4th [facility] [address] [phone]            Apr 20, 2017  2:20 PM CDT   (Arrive by 2:05 PM)   Return Visit with Raghav Faustin MD   [facility] Nose and Th[facility]at ([facility])    [address] [place] [address] [phone]            May 15, 2017  1:00 PM CDT   RETURN RETINA with Lindsey Perez MD   Eye Clinic ([facility])    [address]  [address] [address]   [phone]            Aug 10, 2017 10:00 AM CDT   (Arrive by 9:45 AM)   MR ORBIT FACE NECK W/O & W CONTRAST with [address] [facility] ([facility])    [address] [address] [phone]           Take your medicines as usual, unless your doctor tells you not to. Bring a list of your current medicines to your exam (including vitamins, minerals and  over-the-counter drugs).  You will be given intravenous contrast for this exam. To prepare:   The day before your exam, drink extra fluids at least six 8-ounce glasses (unless your doctor tells you to restrict your fluids).   Have a blood test (creatinine test) within 30 days of your exam. Go to your clinic or Diagnostic Imaging Department for this test.  The MRI machine uses a strong magnet. Please wear clothes without metal (snaps, zippers). A sweatsuit works well, or we may give you a hospital gown.  Please remove any body piercings and hair extensions before you arrive. You will also remove watches, jewelry, hairpins, wallets, dentures, partial dental plates and hearing aids. You may wear contact lenses, and you may be able to wear your rings. We have a safe place to keep your personal items, but it is safer to leave them at home.   **IMPORTANT** THE INSTRUCTIONS BELOW ARE ONLY FOR THOSE PATIENTS WHO HAVE BEEN TOLD THEY WILL RECEIVE SEDATION OR GENERAL ANESTHESIA DURING THEIR MRI PROCEDURE:  IF YOU WILL RECEIVE SEDATION (take medicine to help you relax during your exam):   You must get the medicine from your doctor before you arrive. Bring the medicine to the exam. Do not take it at home.   Arrive one hour early. Bring someone who can take you home after the test. Your medicine will make you sleepy. After the exam, you may not drive, take a bus or take a taxi by yourself.   No eating 8 hours before your exam. You may have clear liquids up until 4 hours before your exam. (Clear liquids include water, clear tea, black coffee and fruit juice without pulp.)  IF YOU WILL RECEIVE ANESTHESIA (be asleep for your exam):   Arrive 1 1/2 hours early. Bring someone who can take you home after the test. You may not drive, take a bus or take a taxi by yourself.   No eating 8 hours before your exam. You may have clear liquids up until 4 hours before your exam. (Clear liquids include water, clear tea, black coffee and fruit  juice without pulp.)  Please call the Imaging Department at your exam site with any questions.              Who to contact     If you have questions or need follow up information about today's clinic visit or your schedule please contact [facility] directly at [phone].  Normal or non-critical lab and imaging results will be communicated to you by [employer], letter or phone within 4 business days after the clinic has received the results. If you do not hear from us within 7 days, please contact the clinic through [employer] or phone. If you have a critical or abnormal lab result, we will notify you by phone as soon as possible.  Submit refill requests through [employer] or call your pharmacy and they will forward the refill request to us. Please allow 3 business days for your refill to be completed.          Additional Information About Your Visit        [employer] Information     [employer] gives you secure access to your electronic health record. If you see a primary care provider, you can also send messages to your care team and make appointments. If you have questions, please call your primary care clinic.  If you do not have a primary care provider, please call [phone] and they will assist you.        Care EveryWhere ID     This is your Care EveryWhere ID. This could be used by other organizations to access your [place] medical records  [id number]         Blood Pressure from Last 3 Encounters:   12/27/16 128/77   11/16/16 119/60   10/24/16 108/72    Weight from Last 3 Encounters:   02/09/17 127 kg (280 lb)   02/09/17 129.3 kg (285 lb)   12/27/16 134.7 kg (296 lb 15.4 oz)              We Performed the Following     MANUAL THER TECH,1+REGIONS,EA 15 MIN     THERAPEUTIC EXERCISES        Primary Care Provider Office Phone # Fax #    Christopher Gaytan [phone]       PRIMARY [facility]ARE CENTER [address] [place] [id number]        Thank you!     Thank you for choosing [facility]  for your care. Our goal is always to provide you with excellent care. Hearing back from our patients is one way we can continue to improve our services. Please take a few minutes to complete the written survey that you may receive in the mail after your visit with us. Thank you!             Your Updated Medication List - Protect others around you: Learn how to safely use, store and throw away your medicines at www.disposemymeds.org.          This list is accurate as of: 2/28/17 11:55 PM.  Always use your most recent med list.                   Brand Name Dispense Instructions for use    amLODIPine 10 MG tablet    NORVASC    90 tablet    Take 1 tablet (10 mg) by mouth daily       busPIRone 10 MG tablet    BUSPAR         latanoprost 0.005 % ophthalmic solution    XALATAN    1 Bottle    Place 1 drop into both eyes At Bedtime       lisinopril 40 MG tablet    PRINIVIL/ZESTRIL    90 tablet    Take 1 tablet (40 mg) by mouth daily       methylphenidate 10 MG tablet    RITALIN         ofloxacin 0.3 % ophthalmic solution    OCUFLOX         ofloxacin 0.3 % otic solution    FLOXIN    10 mL    Instill into affected ear/s:  3gtts BID x 7 days       omeprazole 40 MG capsule    priLOSEC    90 capsule    Take 1 capsule (40 mg) by mouth daily       * order for DME      Use your CPAP device as directed by your provider.       * order for DME     2 Device    Equipment being ordered: double strap ankle brace- XL for right and left ankle       * order for DME     1 Device    Equipment being ordered: CMC brace, LEFT, XL, $28       * timolol 0.5 % ophthalmic solution    TIMOPTIC    1 Bottle    Place 1 drop into both eyes daily       * timolol 0.5 % ophthalmic gel-form    TIMOPTIC-XE    5 mL    Place 1 drop into both eyes daily       traZODone 50 MG tablet    DESYREL         * venlafaxine 150 MG 24 hr capsule    EFFEXOR-XR         * venlafaxine 75 MG 24 hr capsule    EFFEXOR-XR         * Notice:  This list has 7 medication(s) that  are the same as other medications prescribed for you. Read the directions carefully, and ask your doctor or other care provider to review them with you.

## 2017-02-28 NOTE — PROGRESS NOTES
SOAP note objective information for 2/28/2017.    Diagnosis: L thumb CMC and thenar pain   DOI: August 2016    Luiz Ballesteros is a 52 year old right hand dominant male.  Patient reports symptoms of pain of the left thumb which occurred around the time he was making a cigar box guitar, since then random things trigger it holding book holding steering wheel and phone, opening a door. . Since onset symptoms are Gradually getting worse.  Special tests:  x-ray negative for CMC OA .  Previous treatment: comfort cool, rest .    General health as reported by patient is excellent.  Pertinent medical history includes:cancer ACC, high blood pressure, sleep disorder/apnea  Medical allergies:see chart, late. Surgical history: cancer:B  tennis elbow , orthopedic  Medication history: none.    Current occupation is self employed medical and legal transcription,  Much computer and cell phone usage.[employer]   Currently working in normal job without restrictions  Job Tasks: computer work and cell phone   Barriers include:none  Prior functional level:no limitations    Additional Occupational Profile Information (patterns of daily living, interests, values and needs): turning door knobs, holding things, documents books etc     Objective:  Pain Level Report  VAS(0-10) 2/14/2017 2/21 2/28   At Rest: 0 1-2 0-2   With Use: 8 8-9 6-8     Report of Pain:  Location:  thumb  Pain Quality:  Sharp and Shooting  Frequency: intermittent    Pain is worst:  daytime  Exacerbated by:  Holding   Relieved by:  rest  Progression:  Gradually better.  ROM:wnl    Thumb Observation/Appearance:  Key: + = present/ - = not observed    2/14/2017   Shoulder deformity present over CMC R:+  L:+   Volar subluxation present R:-  L:-   Edema over the CMC joint R:-  L:+ mild    Noted collapse of MP into hyperextension during pinch R:  L:   Tenderness at CMC R:  L:      Provocative Tests:  Pain Report:  - none    + mild    ++ moderate    +++ severe      2/14/2017   CMC Grind test R:-  L:+   CMC Joint line/pain R:+  L:++   CMC AP joint laxity R:-  L: -   FInkelstein's R:-  L:-   Crepitus present R:-  L:-         Strength:   (Measured in pounds)  Pain Report:  - none    + mild    ++ moderate    +++ severe    2/14/2017    Trials Right  Left    1  2  3 100  110  104 105  105  80   Average: 104 97     3 pt  Pinch 2/14/2017    Trials Right Left    1  2  3 18  18  20 20  20  20   Average: 19 20     lateral Pinch 2/14/2017    Trials Right  Left    1  2  3 24  21  21 23  21  20   Average: 23 21     Plan:  Frequency:  1 X week, once daily  Duration:  for 6 weeks    Treatment Plan:  Modalities:  Paraffin  Therapeutic Exercise: AROM, Isometrics, and Stabilization exercises of the Thumb CMC, including active and resisted abduction, 1st DI strengthening  Manual Techniques: Joint Mobilization or reseating of the trapezium, self MFR to thumb adductor with clip  Orthosis fabrication:  Hand based Thumb Spica, Custom neoprene support   Education: Anatomy of CMC, joint protection principles, adaptive equipment as needed    Home Program:  1st web release with clip  Adductor stretch  CMC neoprene cool comfort splint during the day with ADL s per symptoms, make sure strap is cradling CMCJ   Incorporate joint protection into daily functional activities  Adaptive equipment as needed.  Custom L hand CMC support  Orthosis    Next Visit:  Check response to stabilization exercises   integrated function and   Warmth

## 2017-03-13 ENCOUNTER — TELEPHONE (OUTPATIENT)
Dept: OTOLARYNGOLOGY | Facility: CLINIC | Age: 52
End: 2017-03-13

## 2017-03-13 DIAGNOSIS — H40.1190 POAG (PRIMARY OPEN-ANGLE GLAUCOMA): Primary | ICD-10-CM

## 2017-03-13 NOTE — TELEPHONE ENCOUNTER
Patient called wanting to speak with Dr. Nissen's nurse. He states he was swimming five days ago and since then has had otorrhea from his right ear. He states the drainage is clear but thick. He denies otalgia and is afebrile, but is concerned that the ear is still draining. He would like Dr. Nissen to take a look at it and is here at [facility] for his glaucoma screening tomorrow at 1000(two hour appointment.) Ok to add to Dr. Nissen's schedule at 1300. Msg sent to Scheduling.    Lisa Marquez, RN Care Coordinator  [facility] Otol[facility]ology/Head & Neck Surgery  Direct contact: [phone]

## 2017-03-14 ENCOUNTER — OFFICE VISIT (OUTPATIENT)
Dept: OTOLARYNGOLOGY | Facility: CLINIC | Age: 52
End: 2017-03-14

## 2017-03-14 VITALS — WEIGHT: 280 LBS | BODY MASS INDEX: 35.94 KG/M2 | HEIGHT: 74 IN

## 2017-03-14 DIAGNOSIS — H92.11 OTORRHEA OF RIGHT EAR: Primary | ICD-10-CM

## 2017-03-14 RX ORDER — OFLOXACIN 3 MG/ML
SOLUTION AURICULAR (OTIC)
Qty: 10 ML | Refills: 0 | Status: SHIPPED | OUTPATIENT
Start: 2017-03-14

## 2017-03-14 ASSESSMENT — PAIN SCALES - GENERAL: PAINLEVEL: MILD PAIN (3)

## 2017-03-14 NOTE — PROGRESS NOTES
Dear Christopher Adames:    I had the pleasure of seeing Luiz Ballesteros in followup today at the [facility].    HISTORY OF PRESENT ILLNESS: The patient is a 52-year-old in today for followup from his last visit in November of 2016.  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  He had a right tube placed initially in November, 2015 followed by a T-tube placed in March of 2016.  At his last visit, he had some irritation in the ear with a polyp present which cleared using drops.  He has been doing well.  Was recently in [place] and swam for five days without earplugs.  He forgot the plugs but went ahead and swam.  He says the ear feels plugged on the right with some moisture and a little bit of odor.  Otherwise, he has been doing well.           MEDICATIONS: Please refer to the detailed medication reconciliation performed by my nurse today, which I have reviewed and signed.     ALLERGIES:    Allergies   Allergen Reactions     Augmentin Rash     Clavulanic Acid      augmentin, but pt has no reaction to just penicillin     Sulfa Drugs Swelling       HABITS:   Alcohol use No    History   Smoking Status     Never Smoker   Smokeless Tobacco     Never Used         PAST MEDICAL HISTORY:  Please see today's intake form (for the remainder of the PMH) which I reviewed and signed.  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, N0, M0     Gastro-oesophageal reflux disease      Glaucoma      early OAG     Hypertension      Nonsenile cataract      DEVONTE (obstructive sleep apnea) 2008     Retinal detachment      BE     Strabismus        FAMILY HISTORY/SOCIAL HISTORY:    Family History   Problem Relation Age of Onset     CANCER Other      aunt- lymphoma     Glaucoma No family hx of      Macular Degeneration No  family hx of      Retinal detachment No family hx of      Amblyopia No family hx of      DIABETES No family hx of     Social History     Social History     Marital status:      Spouse name: N/A     Number of children: N/A     Years of education: N/A     Occupational History     Not on file.     Social History Main Topics     Smoking status: Never Smoker     Smokeless tobacco: Never Used     Alcohol use No     Drug use: No     Sexual activity: Not on file     Other Topics Concern     Not on file     Social History Narrative       REVIEW OF SYSTEMS: Please see today's intake form (for the remainder of the ROS) which I have reviewed and signed.    PHYSICIAL EXAMINATION:  Constitutional: The patient was well-groomed and in no acute distress.   Skin: Warm and pink.  Psychiatric: The patient's affect was calm, cooperative, and appropriate.   Respiratory: Breathing comfortably without stridor or exertion of accessory muscles.  Eyes: Pupils were equal and reactive. Extraocular movement intact.   Head: Normocephalic and atraumatic. No lesions or scars.  Ears: On exam he does have debris within the right ear canal with some drainage so was placed under the microscope.  Under high-powered magnification, the right side was cleaned with suction tips.  Following cleaning, the tube looks to be in place and patent.  Drops were placed.  Left side looks like the tympanic membrane is intact with no concerns or problems after cleaning using similar techniques.     Nose: Sinuses were nontender. Anterior rhinoscopy revealed midline septum and absence of purulence or polyps.  Oral Cavity: Normal tongue, floor of moth, buccal mucosa, and palate. No abnormal lymph tissue in the oropharynx.   Neck: The parotid is soft without masses. Supple with normal laryngeal and tracheal landmarks.   Lymphatic: There is no palpable lymphadenopathy or other masses in the neck.   Neurologic: Alert and oriented x 3. Cranial nerves III-XI within  normal limits. Voice quality normal.  Cerebellar Function Tests:  Grossly normal    Audiogram:  None today.  Tuning forks show positive responses.         IMPRESSION AND PLAN: I am going to put him on some Floxin otic drops three times a day for a week and then twice a day for a week.  I will see him back if that does not clear or any problems persist; otherwise we will follow up in a year.         Thank you very much for the opportunity to participate in the care of your patient.    Rick L Nissen MD

## 2017-03-14 NOTE — MR AVS SNAPSHOT
After Visit Summary   3/14/2017    Luiz Ballesteros    MRN: [id number]           Patient Information     Date Of Birth          1965        Visit Information        Provider Department      3/14/2017 1:00 PM Nissen, Rick L, MD [facility]        Today's Diagnoses     Otorrhea of right ear    -  1       Follow-ups after your visit        Your next 10 appointments already scheduled     Mar 21, 2017  9:00 AM CDT   VISUAL FIELD with [facility] EYE VISUAL FIELD   Eye Clinic ([facility])    [address] [address]   [phone]            Mar 21, 2017  9:30 AM CDT   RETURN GLAUCOMA with Jennyfer Mccall MD   Eye Clinic ([facility])    [address]  [address] [address]   [phone]            Mar 21, 2017  4:00 PM CDT   NIC Hand with Noris Carrington OT   [facility] ([facility])    [address] [place] [id number] [phone]            Apr 20, 2017  2:20 PM CDT   (Arrive by 2:05 PM)   Return Visit with Raghav Faustin MD   [facility] ([facility])    [address] [place] [id number] [phone]            May 15, 2017  1:00 PM CDT   RETURN RETINA with Lindsey Perez MD   Eye Clinic ([facility])    [address]  [address] [place] [address]   [phone]            Aug 10, 2017 10:00 AM CDT   (Arrive by 9:45 AM)   MR ORBIT FACE NECK W/O & W CONTRAST with [facility] ([facility])    [address] [place] [id number] [phone]           Take your medicines as usual, unless your doctor tells you not to. Bring a list of your current medicines to your exam (including  vitamins, minerals and over-the-counter drugs).  You will be given intravenous contrast for this exam. To prepare:   The day before your exam, drink extra fluids at least six 8-ounce glasses (unless your doctor tells you to restrict your fluids).   Have a blood test (creatinine test) within 30 days of your exam. Go to your clinic or Diagnostic Imaging Department for this test.  The MRI machine uses a strong magnet. Please wear clothes without metal (snaps, zippers). A sweatsuit works well, or we may give you a hospital gown.  Please remove any body piercings and hair extensions before you arrive. You will also remove watches, jewelry, hairpins, wallets, dentures, partial dental plates and hearing aids. You may wear contact lenses, and you may be able to wear your rings. We have a safe place to keep your personal items, but it is safer to leave them at home.   **IMPORTANT** THE INSTRUCTIONS BELOW ARE ONLY FOR THOSE PATIENTS WHO HAVE BEEN TOLD THEY WILL RECEIVE SEDATION OR GENERAL ANESTHESIA DURING THEIR MRI PROCEDURE:  IF YOU WILL RECEIVE SEDATION (take medicine to help you relax during your exam):   You must get the medicine from your doctor before you arrive. Bring the medicine to the exam. Do not take it at home.   Arrive one hour early. Bring someone who can take you home after the test. Your medicine will make you sleepy. After the exam, you may not drive, take a bus or take a taxi by yourself.   No eating 8 hours before your exam. You may have clear liquids up until 4 hours before your exam. (Clear liquids include water, clear tea, black coffee and fruit juice without pulp.)  IF YOU WILL RECEIVE ANESTHESIA (be asleep for your exam):   Arrive 1 1/2 hours early. Bring someone who can take you home after the test. You may not drive, take a bus or take a taxi by yourself.   No eating 8 hours before your exam. You may have clear liquids up until 4 hours before your exam. (Clear liquids include water, clear tea,  "black coffee and fruit juice without pulp.)  Please call the Imaging Department at your exam site with any questions.              Who to contact     Please call your clinic at [phone] to:    Ask questions about your health    Make or cancel appointments    Discuss your medicines    Learn about your test results    Speak to your doctor   If you have compliments or concerns about an experience at your clinic, or if you wish to file a complaint, please contact [facility] Patient Relations at [phone] or email us at [email or web address]         Additional Information About Your Visit        OmnidroneharPopular Pays Information     [employer] gives you secure access to your electronic health record. If you see a primary care provider, you can also send messages to your care team and make appointments. If you have questions, please call your primary care clinic.  If you do not have a primary care provider, please call [phone] and they will assist you.      [employer] is an electronic gateway that provides easy, online access to your medical records. With [employer], you can request a clinic appointment, read your test results, renew a prescription or communicate with your care team.     To access your existing account, please contact your [facility] Clinic or call [phone] for assistance.        Care EveryWhere ID     This is your Care EveryWhere ID. This could be used by other organizations to access your [place] medical records  [id number]        Your Vitals Were     Height BMI (Body Mass Index)                1.88 m (6' 2.02\") 35.93 kg/m2           Blood Pressure from Last 3 Encounters:   12/27/16 128/77   11/16/16 119/60   10/24/16 108/72    Weight from Last 3 Encounters:   03/14/17 127 kg (280 lb)   02/09/17 127 kg (280 lb)   02/09/17 129.3 kg (285 lb)              We Performed the Following     BINOCULAR MICROSCOPY          Today's Medication Changes        "   These changes are accurate as of: 3/14/17 11:59 PM.  If you have any questions, ask your nurse or doctor.               These medicines have changed or have updated prescriptions.        Dose/Directions    * ofloxacin 0.3 % otic solution   Commonly known as:  FLOXIN   This may have changed:  Another medication with the same name was added. Make sure you understand how and when to take each.   Used for:  Ear pain, unspecified laterality   Changed by:  Nissen, Rick L, MD        Instill into affected ear/s:  3gtts BID x 7 days   Quantity:  10 mL   Refills:  0       * ofloxacin 0.3 % otic solution   Commonly known as:  FLOXIN   This may have changed:  You were already taking a medication with the same name, and this prescription was added. Make sure you understand how and when to take each.   Used for:  Otorrhea of right ear   Changed by:  Nissen, Rick L, MD        Place 3gtts into affected ear TID x 7 days, then BID x 7 days, then DAILY x 7 days, then stop   Quantity:  10 mL   Refills:  0       * Notice:  This list has 2 medication(s) that are the same as other medications prescribed for you. Read the directions carefully, and ask your doctor or other care provider to review them with you.         Where to get your medicines      These medications were sent to [place] [id number] [facility]  [address] [id number]    Hours:  TRANSPLANT PHONE NUMBER [phone] Phone:  [phone]     ofloxacin 0.3 % otic solution                Primary Care Provider Office Phone # Fax #    Christopher Gaytan [phone]       [facility] [address] [place] [id number]        Thank you!     Thank you for choosing [facility]  for your care. Our goal is always to provide you with excellent care. Hearing back from our patients is one way we can continue to improve our services. Please take a few minutes  to complete the written survey that you may receive in the mail after your visit with us. Thank you!             Your Updated Medication List - Protect others around you: Learn how to safely use, store and throw away your medicines at www.disposemymeds.org.          This list is accurate as of: 3/14/17 11:59 PM.  Always use your most recent med list.                   Brand Name Dispense Instructions for use    amLODIPine 10 MG tablet    NORVASC    90 tablet    Take 1 tablet (10 mg) by mouth daily       busPIRone 10 MG tablet    BUSPAR         latanoprost 0.005 % ophthalmic solution    XALATAN    1 Bottle    Place 1 drop into both eyes At Bedtime       lisinopril 40 MG tablet    PRINIVIL/ZESTRIL    90 tablet    Take 1 tablet (40 mg) by mouth daily       methylphenidate 10 MG tablet    RITALIN         ofloxacin 0.3 % ophthalmic solution    OCUFLOX         * ofloxacin 0.3 % otic solution    FLOXIN    10 mL    Instill into affected ear/s:  3gtts BID x 7 days       * ofloxacin 0.3 % otic solution    FLOXIN    10 mL    Place 3gtts into affected ear TID x 7 days, then BID x 7 days, then DAILY x 7 days, then stop       omeprazole 40 MG capsule    priLOSEC    90 capsule    Take 1 capsule (40 mg) by mouth daily       * order for DME      Use your CPAP device as directed by your provider.       * order for DME     2 Device    Equipment being ordered: double strap ankle brace- XL for right and left ankle       * order for DME     1 Device    Equipment being ordered: [employer] brace, LEFT, XL, $28       * timolol 0.5 % ophthalmic solution    TIMOPTIC    1 Bottle    Place 1 drop into both eyes daily       * timolol 0.5 % ophthalmic gel-form    TIMOPTIC-XE    5 mL    Place 1 drop into both eyes daily       traZODone 50 MG tablet    DESYREL         * venlafaxine 150 MG 24 hr capsule    EFFEXOR-XR         * venlafaxine 75 MG 24 hr capsule    EFFEXOR-XR         * Notice:  This list has 9 medication(s) that are the same as other  medications prescribed for you. Read the directions carefully, and ask your doctor or other care provider to review them with you.

## 2017-03-14 NOTE — NURSING NOTE
Chief Complaint   Patient presents with     RECHECK     right ear     Shanna Arora Medical Assistant

## 2017-03-14 NOTE — LETTER
3/14/2017       RE: Luiz Ballesteros  [address] [place] [address]     Dear Colleague,    Thank you for referring your patient, Luiz Ballesteros, to the [facility] at [facility]. Please see a copy of my visit note below.    Dear Christopher Adames:    I had the pleasure of seeing Luiz Ballesteros in followup today at the [facility].    HISTORY OF PRESENT ILLNESS: The patient is a 52-year-old in today for followup from his last visit in November of 2016.  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  He had a right tube placed initially in November, 2015 followed by a T-tube placed in March of 2016.  At his last visit, he had some irritation in the ear with a polyp present which cleared using drops.  He has been doing well.  Was recently in [place] and swam for five days without earplugs.  He forgot the plugs but went ahead and swam.  He says the ear feels plugged on the right with some moisture and a little bit of odor.  Otherwise, he has been doing well.           MEDICATIONS: Please refer to the detailed medication reconciliation performed by my nurse today, which I have reviewed and signed.     ALLERGIES:    Allergies   Allergen Reactions     Augmentin Rash     Clavulanic Acid      augmentin, but pt has no reaction to just penicillin     Sulfa Drugs Swelling       HABITS:   Alcohol use No    History   Smoking Status     Never Smoker   Smokeless Tobacco     Never Used         PAST MEDICAL HISTORY:  Please see today's intake form (for the remainder of the PMH) which I reviewed and signed.  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, N0, M0     Gastro-oesophageal reflux disease      Glaucoma      early OAG      Hypertension      Nonsenile cataract      DEVONTE (obstructive sleep apnea) 2008     Retinal detachment      BE     Strabismus        FAMILY HISTORY/SOCIAL HISTORY:    Family History   Problem Relation Age of Onset     CANCER Other      aunt- lymphoma     Glaucoma No family hx of      Macular Degeneration No family hx of      Retinal detachment No family hx of      Amblyopia No family hx of      DIABETES No family hx of     Social History     Social History     Marital status:      Spouse name: N/A     Number of children: N/A     Years of education: N/A     Occupational History     Not on file.     Social History Main Topics     Smoking status: Never Smoker     Smokeless tobacco: Never Used     Alcohol use No     Drug use: No     Sexual activity: Not on file     Other Topics Concern     Not on file     Social History Narrative       REVIEW OF SYSTEMS: Please see today's intake form (for the remainder of the ROS) which I have reviewed and signed.    PHYSICIAL EXAMINATION:  Constitutional: The patient was well-groomed and in no acute distress.   Skin: Warm and pink.  Psychiatric: The patient's affect was calm, cooperative, and appropriate.   Respiratory: Breathing comfortably without stridor or exertion of accessory muscles.  Eyes: Pupils were equal and reactive. Extraocular movement intact.   Head: Normocephalic and atraumatic. No lesions or scars.  Ears: On exam he does have debris within the right ear canal with some drainage so was placed under the microscope.  Under high-powered magnification, the right side was cleaned with suction tips.  Following cleaning, the tube looks to be in place and patent.  Drops were placed.  Left side looks like the tympanic membrane is intact with no concerns or problems after cleaning using similar techniques.     Nose: Sinuses were nontender. Anterior rhinoscopy revealed midline septum and absence of purulence or polyps.  Oral Cavity: Normal tongue, floor of moth, buccal  mucosa, and palate. No abnormal lymph tissue in the oropharynx.   Neck: The parotid is soft without masses. Supple with normal laryngeal and tracheal landmarks.   Lymphatic: There is no palpable lymphadenopathy or other masses in the neck.   Neurologic: Alert and oriented x 3. Cranial nerves III-XI within normal limits. Voice quality normal.  Cerebellar Function Tests:  Grossly normal    Audiogram:  None today.  Tuning forks show positive responses.         IMPRESSION AND PLAN: I am going to put him on some Floxin otic drops three times a day for a week and then twice a day for a week.  I will see him back if that does not clear or any problems persist; otherwise we will follow up in a year.         Thank you very much for the opportunity to participate in the care of your patient.    Rick L Nissen MD

## 2017-03-16 DIAGNOSIS — I10 ESSENTIAL HYPERTENSION, BENIGN: ICD-10-CM

## 2017-03-16 NOTE — LETTER
[facility]  [address]  [address] [facility] [address]      March 17, 2017      Luiz Ballesteros  [facility] [place] [address]        Dear Luiz,    This letter is a reminder that you are overdue to see your Primary Care Provider for an Annual Visit and needed labs. You must be seen by your Primary Care Provider on a yearly basis and have appropriate labs drawn for continued care and prescription refills. Please call [phone] to schedule an appointment for an Annual Visit with Dr Christopher Gaytan MD.       You have been given a 30 day supply/refill of your Amlodipine  while you get your clinic visit/labs completed.      Regards,    Primary Care Center

## 2017-03-17 RX ORDER — AMLODIPINE BESYLATE 10 MG/1
10 TABLET ORAL DAILY
Qty: 30 TABLET | Refills: 0 | Status: SHIPPED | OUTPATIENT
Start: 2017-03-17 | End: 2017-05-12

## 2017-03-17 NOTE — TELEPHONE ENCOUNTER
amLODIPine (NORVASC) 10 MG tablet      Last Written Prescription Date: 7-25-16  Last Fill Quantity: 90, # refills: 2    Last Office Visit :  3-7-17   Next Office Visit: none    BP Readings from Last 3 Encounters:   12/27/16 128/77   11/16/16 119/60   10/24/16 108/72       Kathleen M Doege RN

## 2017-03-21 ENCOUNTER — APPOINTMENT (OUTPATIENT)
Dept: OPHTHALMOLOGY | Facility: CLINIC | Age: 52
End: 2017-03-21
Attending: OPHTHALMOLOGY
Payer: COMMERCIAL

## 2017-03-21 ENCOUNTER — THERAPY VISIT (OUTPATIENT)
Dept: OCCUPATIONAL THERAPY | Facility: CLINIC | Age: 52
End: 2017-03-21
Payer: COMMERCIAL

## 2017-03-21 DIAGNOSIS — M79.645 THUMB PAIN, LEFT: ICD-10-CM

## 2017-03-21 DIAGNOSIS — H40.1190 POAG (PRIMARY OPEN-ANGLE GLAUCOMA): ICD-10-CM

## 2017-03-21 DIAGNOSIS — H40.1131 PRIMARY OPEN ANGLE GLAUCOMA OF BOTH EYES, MILD STAGE: Primary | ICD-10-CM

## 2017-03-21 DIAGNOSIS — M18.9 CMC ARTHRITIS, THUMB, DEGENERATIVE: ICD-10-CM

## 2017-03-21 DIAGNOSIS — Z96.1 PSEUDOPHAKIA OF BOTH EYES: ICD-10-CM

## 2017-03-21 PROCEDURE — 92083 EXTENDED VISUAL FIELD XM: CPT | Mod: ZF | Performed by: OPHTHALMOLOGY

## 2017-03-21 PROCEDURE — 97110 THERAPEUTIC EXERCISES: CPT | Mod: GO | Performed by: OCCUPATIONAL THERAPIST

## 2017-03-21 PROCEDURE — 97535 SELF CARE MNGMENT TRAINING: CPT | Mod: GO | Performed by: OCCUPATIONAL THERAPIST

## 2017-03-21 PROCEDURE — 92133 CPTRZD OPH DX IMG PST SGM ON: CPT | Mod: ZF | Performed by: OPHTHALMOLOGY

## 2017-03-21 PROCEDURE — 99213 OFFICE O/P EST LOW 20 MIN: CPT | Mod: 25

## 2017-03-21 ASSESSMENT — REFRACTION_WEARINGRX
OD_CYLINDER: +1.75
SPECS_TYPE: PAL
OD_AXIS: 145
OS_CYLINDER: +1.00
OS_ADD: +2.50
OS_AXIS: 075
OS_SPHERE: -5.75
OD_ADD: +2.50

## 2017-03-21 ASSESSMENT — TONOMETRY
OD_IOP_MMHG: 13
OS_IOP_MMHG: 14
IOP_METHOD: APPLANATION

## 2017-03-21 ASSESSMENT — CONF VISUAL FIELD
OD_INFERIOR_NASAL_RESTRICTION: 3
OD_SUPERIOR_NASAL_RESTRICTION: 3
OD_INFERIOR_TEMPORAL_RESTRICTION: 3

## 2017-03-21 ASSESSMENT — SLIT LAMP EXAM - LIDS
COMMENTS: NORMAL
COMMENTS: NORMAL

## 2017-03-21 ASSESSMENT — EXTERNAL EXAM - RIGHT EYE: OD_EXAM: ERYTHEMA AND SCALING

## 2017-03-21 ASSESSMENT — PACHYMETRY
OS_CT(UM): 646
OD_CT(UM): 642

## 2017-03-21 ASSESSMENT — CUP TO DISC RATIO
OD_RATIO: 0.7
OS_RATIO: 0.7

## 2017-03-21 ASSESSMENT — VISUAL ACUITY
OS_CC+: +2
CORRECTION_TYPE: GLASSES
OD_PH_CC: 20/40
OS_PH_CC: 20/25
OD_CC: 20/50
OS_CC: 20/30
METHOD: SNELLEN - LINEAR

## 2017-03-21 ASSESSMENT — EXTERNAL EXAM - LEFT EYE: OS_EXAM: ERYTHEMA AND SCALING

## 2017-03-21 NOTE — NURSING NOTE
Chief Complaints and History of Present Illnesses   Patient presents with     Follow Up For     POAG (primary open-angle glaucoma) (Primary Dx)     HPI    Affected eye(s):  Both   Symptoms:     No blurred vision   No decreased vision   No floaters   No flashes   No halos   No starbursts   No tearing   No Dryness      Duration:  8 months   Frequency:  Constant       Do you have eye pain now?:  No      Comments:  States va is the same since last visit    Timolol  both eyes last used 6:00 am   Latanopros both eyes 9:00 pm    Ben Rubin  10:22 AM March 21, 2017

## 2017-03-21 NOTE — PATIENT INSTRUCTIONS
Patient will continue on Timolol which is a yellow top drop in the morning in both eyes and Latanoprost which is a teal top drop at bedtime in both eyes.  Patient will return to clinic in 8-12 months with visual field test, dilated eye exam and OCT with RNFL analysis.

## 2017-03-21 NOTE — MR AVS SNAPSHOT
After Visit Summary   3/21/2017    Luiz Ballesteros    MRN: [id number]           Patient Information     Date Of Birth          1965        Visit Information        Provider Department      3/21/2017 9:30 AM Jennyfer Mccall MD [facility]        Today's Diagnoses     Primary open angle glaucoma of both eyes, mild stage    -  1    POAG (primary open-angle glaucoma)        Pseudophakia of both eyes          Care Instructions    Patient will continue on Timolol which is a yellow top drop in the morning in both eyes and Latanoprost which is a teal top drop at bedtime in both eyes.  Patient will return to clinic in 8-12 months with visual field test, dilated eye exam and OCT with RNFL analysis.          Follow-ups after your visit        Your next 10 appointments already scheduled     Mar 21, 2017  4:00 PM CDT   NIC Hand with Noris Carrington OT   [facility] ([facility])    [address]  [address] [facility] [id number] [phone]            Apr 20, 2017  2:20 PM CDT   (Arrive by 2:05 PM)   Return Visit with Raghav Faustin MD   [facility] ([facility])    [address]  [address] [facility] [id number] [phone]            May 15, 2017  1:00 PM CDT   RETURN RETINA with Lindsey Perez MD   Eye Clinic ([facility] [address] [facility])    [address]  [address] [place]  [address] [address]  [facility] [address]   [phone]            Aug 10, 2017 10:00 AM CDT   (Arrive by 9:45 AM)   MR ORBIT FACE NECK W/O & W CONTRAST with [address] [facility] ([facility])    [address]  [address] [facility] [id number] [phone]           Take your medicines as usual, unless your doctor tells you not to. Bring a list of your current medicines to your exam (including vitamins, minerals and over-the-counter drugs).   You will be given intravenous contrast for this exam. To prepare:   The day before your exam, drink extra fluids at least six 8-ounce glasses (unless your doctor tells you to restrict your fluids).   Have a blood test (creatinine test) within 30 days of your exam. Go to your clinic or Diagnostic Imaging Department for this test.  The MRI machine uses a strong magnet. Please wear clothes without metal (snaps, zippers). A sweatsuit works well, or we may give you a hospital gown.  Please remove any body piercings and hair extensions before you arrive. You will also remove watches, jewelry, hairpins, wallets, dentures, partial dental plates and hearing aids. You may wear contact lenses, and you may be able to wear your rings. We have a safe place to keep your personal items, but it is safer to leave them at home.   **IMPORTANT** THE INSTRUCTIONS BELOW ARE ONLY FOR THOSE PATIENTS WHO HAVE BEEN TOLD THEY WILL RECEIVE SEDATION OR GENERAL ANESTHESIA DURING THEIR MRI PROCEDURE:  IF YOU WILL RECEIVE SEDATION (take medicine to help you relax during your exam):   You must get the medicine from your doctor before you arrive. Bring the medicine to the exam. Do not take it at home.   Arrive one hour early. Bring someone who can take you home after the test. Your medicine will make you sleepy. After the exam, you may not drive, take a bus or take a taxi by yourself.   No eating 8 hours before your exam. You may have clear liquids up until 4 hours before your exam. (Clear liquids include water, clear tea, black coffee and fruit juice without pulp.)  IF YOU WILL RECEIVE ANESTHESIA (be asleep for your exam):   Arrive 1 1/2 hours early. Bring someone who can take you home after the test. You may not drive, take a bus or take a taxi by yourself.   No eating 8 hours before your exam. You may have clear liquids up until 4 hours before your exam. (Clear liquids include water, clear tea, black coffee and fruit juice without pulp.)  Please  call the Imaging Department at your exam site with any questions.            Nov 07, 2017  9:45 AM CST   [facility] VISUAL FIELD   Eye Clinic ([facility])    [address]  [address]  [address] [address]  [facility] [address]   [phone]            Nov 07, 2017 10:15 AM CST   RETURN GLAUCOMA with Jennyfer Mccall MD   [facility] ([facility])    [address]  [address]  [address] [address]  [facility] [address]   [phone]              Who to contact     Please call your clinic at [phone] to:    Ask questions about your health    Make or cancel appointments    Discuss your medicines    Learn about your test results    Speak to your doctor   If you have compliments or concerns about an experience at your clinic, or if you wish to file a complaint, please contact [facility] Patient Relations at [phone] or email us at [email or web address]         Additional Information About Your Visit        [employer] Information     [employer] gives you secure access to your electronic health record. If you see a primary care provider, you can also send messages to your care team and make appointments. If you have questions, please call your primary care clinic.  If you do not have a primary care provider, please call [phone] and they will assist you.      [employer] is an electronic gateway that provides easy, online access to your medical records. With [employer], you can request a clinic appointment, read your test results, renew a prescription or communicate with your care team.     To access your existing account, please contact your [facility] Clinic or call [phone] for assistance.        Care EveryWhere ID     This is your Care EveryWhere ID. This could be used by other organizations to access your [facility] medical records  [id number]         Blood Pressure from Last 3 Encounters:    12/27/16 128/77   11/16/16 119/60   10/24/16 108/72    Weight from Last 3 Encounters:   03/14/17 127 kg (280 lb)   02/09/17 127 kg (280 lb)   02/09/17 129.3 kg (285 lb)              We Performed the Following     OCT Optic Nerve RNFL Spectralis OU (both eyes)     OVF 24-2 Dynamic OU     Pachymetry OU (both eyes)        Primary Care Provider Office Phone # Fax #    Christopher Gaytan [phone]       [facility] [address] [place] [address]        Thank you!     Thank you for choosing EYE CLINIC  for your care. Our goal is always to provide you with excellent care. Hearing back from our patients is one way we can continue to improve our services. Please take a few minutes to complete the written survey that you may receive in the mail after your visit with us. Thank you!             Your Updated Medication List - Protect others around you: Learn how to safely use, store and throw away your medicines at www.disposemymeds.org.          This list is accurate as of: 3/21/17 12:12 PM.  Always use your most recent med list.                   Brand Name Dispense Instructions for use    amLODIPine 10 MG tablet    NORVASC    30 tablet    Take 1 tablet (10 mg) by mouth daily       busPIRone 10 MG tablet    BUSPAR         latanoprost 0.005 % ophthalmic solution    XALATAN    1 Bottle    Place 1 drop into both eyes At Bedtime       lisinopril 40 MG tablet    PRINIVIL/ZESTRIL    90 tablet    Take 1 tablet (40 mg) by mouth daily       methylphenidate 10 MG tablet    RITALIN         ofloxacin 0.3 % ophthalmic solution    OCUFLOX         * ofloxacin 0.3 % otic solution    FLOXIN    10 mL    Instill into affected ear/s:  3gtts BID x 7 days       * ofloxacin 0.3 % otic solution    FLOXIN    10 mL    Place 3gtts into affected ear TID x 7 days, then BID x 7 days, then DAILY x 7 days, then stop       omeprazole 40 MG capsule    priLOSEC    90 capsule    Take 1 capsule (40 mg) by mouth daily        * order for DME      Use your CPAP device as directed by your provider.       * order for DME     2 Device    Equipment being ordered: double strap ankle brace- XL for right and left ankle       * order for DME     1 Device    Equipment being ordered: CMC brace, LEFT, XL, $28       * timolol 0.5 % ophthalmic solution    TIMOPTIC    1 Bottle    Place 1 drop into both eyes daily       * timolol 0.5 % ophthalmic gel-form    TIMOPTIC-XE    5 mL    Place 1 drop into both eyes daily       traZODone 50 MG tablet    DESYREL         * venlafaxine 150 MG 24 hr capsule    EFFEXOR-XR         * venlafaxine 75 MG 24 hr capsule    EFFEXOR-XR         * Notice:  This list has 9 medication(s) that are the same as other medications prescribed for you. Read the directions carefully, and ask your doctor or other care provider to review them with you.

## 2017-03-21 NOTE — PROGRESS NOTES
1)Glc Suspect -- K pachy:642/646    Tmax:     HVF: OD:Fluct (stable from 9583-6006) and OS: Full      CDR: 0.7/0.7 (shallow, indistinct cupping)    HRT/OCT: OD:Severe RNFl thinning and OS:Mild thinning stable from 4116-5897      FHX of Glc:No      Gonio:  OD:partial angle closure and OS:open     Intolerant to:      Asthma/COPD:No, tolerating topical BB   Steroid Use: No     Kidney Stones: No    Sulfa Allergy: Yes, joint pains      IOP targets:  2)PCIOL OS  3)H/O RD OD s/p PPV x2, EL, SB OD -- Dr. Dalton and Paras at   4)H/O RT OS s/p Laser OS  5)High Myopia  6)ACIOL OD    Patient will continue on Timolol which is a yellow top drop in the morning in both eyes and Latanoprost which is a teal top drop at bedtime in both eyes.  Patient will return to clinic in 8-12 months with visual field test, dilated eye exam and OCT with RNFL analysis.      Attending Physician Attestation:  Complete documentation of historical and exam elements from today's encounter can be found in the full encounter summary report (not reduplicated in this progress note). I personally obtained the chief complaint(s) and history of present illness.  I confirmed and edited as necessary the review of systems, past medical/surgical history, family history, social history, and examination findings as documented by others; and I examined the patient myself. I personally reviewed the relevant tests, images, and reports as documented above. I formulated and edited as necessary the assessment and plan and discussed the findings and management plan with the patient and family.  - Jennyfer Mccall MD 11:59 AM 3/21/2017

## 2017-03-21 NOTE — PROGRESS NOTES
Hand Therapy Discharge  Note  Current Date:  3/21/2017    Reporting period is 2/28/17 to 3/21/2017.    Diagnosis: L thumb CMC and thenar pain   DOI: August 2016    Luiz Ballesteros is a 52 year old right hand dominant male.  Patient reports symptoms of pain of the left thumb which occurred around the time he was making a cigar box guitar, since then random things trigger it holding book holding steering wheel and phone, opening a door.Since onset symptoms are Gradually getting worse. Special tests:  x-ray negative for CMC OA.  Previous treatment: comfort cool, rest. General health as reported by patient is excellent.  Pertinent medical history includes:cancer ACC, high blood pressure, sleep disorder/apnea.  Medical allergies:see chart, late. Surgical history: cancer:B  tennis elbow, orthopedic, medication history: none.    Current occupation is self employed medical and legal transcription, much computer and cell phone usage.likes [email or web address].   Currently working in normal job without restrictions  Job Tasks: computer work and cell phone   Barriers include: none  Prior functional level: no limitations    Additional Occupational Profile Information (patterns of daily living, interests, values and needs): turning door knobs, holding things, documents books etc.     S:  Subjective changes as noted by patient: Subjective: I like the CMC support orthosis, Overall my thumb is feeling much better, looking forward to rest of exercises and ADL instruction.   Functional changes noted by patient: Improvement in ability to use phone with less pain   Response to previous treatment: good   Patient has noted adverse reaction to: None    O:  Pain Level Report:  VAS(0-10) 2/14/2017 2/21 2/28 3/21   At Rest: 0 1-2 0-2 0   With Use: 8 8-9 6-8 3   At worst     6--10   Report of Pain:  Location:  thumb  Pain Quality:  Sharp and Shooting  Frequency: intermittent    Pain is worst:  daytime  Exacerbated by:  Holding   Relieved by:   rest  Progression:  Gradually better.  ROM: WNL     Strength:   (Measured in pounds)  Pain Report:  - none    + mild    ++ moderate    +++ severe    2/14    Trials Right  Left    1  2  3 100  110  104 105  105  80   Average: 104 97     3 pt  Pinch 2/14    Trials Right Left    1  2  3 18  18  20 20  20  20   Average: 19 20     lateral Pinch 2/14    Trials Right  Left    1  2  3 24  21  21 23  21  20   Average: 23 21     A:  Response to therapy has been improvement to:  Pain: intensity of pain is decreased and demonstrates good understanding of an ongoing home program.   Overall Assessment:  Patient is ready for a discharge to ongoing home program   STG/LTG:  See goal sheet for details and updates of remaining functional limitations.     Plan:  Discharge to home program.     Treatment Plan:  Modalities:  Paraffin  Therapeutic Exercise: AROM, Isometrics, and Stabilization exercises of the Thumb CMC, including active and resisted abduction, 1st DI strengthening  Manual Techniques: Joint Mobilization or reseating of the trapezium, self MFR to thumb adductor with clip  Orthosis fabrication:  Hand based Thumb Spica, Custom neoprene support   Education: Anatomy of CMC, joint protection principles, adaptive equipment as needed    Home Program:  1st web release with clip  Adductor stretch  CMC neoprene cool comfort splint during the day with ADL s per symptoms, make sure the strap is cradling the CMCJ   Incorporate joint protection into daily functional activities  Adaptive equipment as needed.  Custom L hand CMC support orthosis  Opponens and 1st DI strengthening

## 2017-04-20 ENCOUNTER — OFFICE VISIT (OUTPATIENT)
Dept: OTOLARYNGOLOGY | Facility: CLINIC | Age: 52
End: 2017-04-20

## 2017-04-20 VITALS — WEIGHT: 305 LBS | BODY MASS INDEX: 39.14 KG/M2

## 2017-04-20 DIAGNOSIS — C76.0 ADENOID CYSTIC CARCINOMA OF HEAD AND NECK (H): Primary | ICD-10-CM

## 2017-04-20 ASSESSMENT — PAIN SCALES - GENERAL: PAINLEVEL: NO PAIN (0)

## 2017-04-20 NOTE — NURSING NOTE
Invasive Procedure Safety Checklist  Procedure:  Biopsy of right sinonasal cavity biopsy    Responsible person(s):  Complete sections as appropriate and electronically sign and date below.    Staff/Provider  Consent documentation on chart:  YES  H&P is not applicable (when straight local anesthesia is used).    Procedure Team  Completed by comparing informed consent documentation, information on the patient record and/or the marked surgical site, and discussion with the patient/guardian.     Verified:  (Select all that apply)  Patient identification (two indicators)  Procedure to be performed  Procedure site and /or laterality and/or level  Consent  Procedure site:  Site can not be marked due to location.  Provider Asher - Site/Laterality/Level:  No Level or Structure  Staff/Provider:  No images    Procedure Team:  *Pause for the Cause* verbal and active participation of team members- verify:  Patient name:  YES  Procedure to be performed:  YES  Site, laterality and level, noting patient position:  YES    Above steps completed as applicable (Electronic Signature, Title, Date):    Noris Pastor R.N., B.S.N.  Nurse Coordinator Head & Neck Surgery  [phone]  4/20/2017 4:47 PM       Note:  Any incidents of wrong patient, wrong procedure, or wrong site are reported using the Occurrence Process already in place.  The occurrence form is required to be completed immediately with this type of event.

## 2017-04-20 NOTE — NURSING NOTE
Chief Complaint   Patient presents with     RECHECK     recheck     Shanna Arora Medical Assistant

## 2017-04-20 NOTE — PROGRESS NOTES
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      TREATMENT:  The patient underwent transnasal endoscopic resection on 02/02/2015 followed by postoperative adjuvant proton beam radiation therapy and IMRT at [facility].  The patient finished radiation therapy on 05/20/2015.      HISTORY OF PRESENT ILLNESS:  Mr. Ballesteros is coming today to the Otolaryngology Clinic for sinonasal debridement.  The patient states that in the last few weeks, he has been experiencing more nasal congestion and rhinorrhea.  He thinks that this is due to a tree pollen allergy.  Other than that, no other symptoms.      PROCEDURE:  I used a 0-degree nasal endoscope to gain access into the nasal cavity.  I removed abundant sinonasal crust from the sinonasal passages.  After removing the sinonasal crust, I did spot an abnormality right at the level of the area of the descending palatine nerve.  This is a papillomatous lesion.  I also spotted another lesion immediately above this that will correspond to the pterygopalatine fossa.  This is covered by mucosa but is slight bulging out in this region.  With that in mind, I did a biopsy of this area.  I took about three pieces.  After that, I did cauterize the area that was bleeding.  This was easily controlled.  The patient tolerated the biopsy with no problems.      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  Today, biopsies were obtained from an area that is suspicious for recurrence.  We will be chart checking those pathology results.

## 2017-04-20 NOTE — PATIENT INSTRUCTIONS
1.  You were seen in the ENT Clinic today by Dr. Faustin.  If you have questions or concerns after your appointment please call, [phone].  Press option #1 for scheduling related needs.  Press option #3 for Nurse advice.  2.  Plan is to call you with your pathology results.  This process will take 5-7 days.  3. RN Care Coordinator is Noris, [phone]  4.  Avoid heavy nose blowing for the next 24 hours.  If your nose bleeds, okay to use Afrin for temporary relief.

## 2017-04-20 NOTE — LETTER
4/20/2017       RE: Luiz Ballesteros  [address] [place] [address]     Dear Colleague,    Thank you for referring your patient, Luiz Ballesteros, to the [facility] at [facility]. Please see a copy of my visit note below.    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      TREATMENT:  The patient underwent transnasal endoscopic resection on 02/02/2015 followed by postoperative adjuvant proton beam radiation therapy and IMRT at [facility].  The patient finished radiation therapy on 05/20/2015.      HISTORY OF PRESENT ILLNESS:  Mr. Ballesteros is coming today to the Otolaryngology Clinic for sinonasal debridement.  The patient states that in the last few weeks, he has been experiencing more nasal congestion and rhinorrhea.  He thinks that this is due to a tree pollen allergy.  Other than that, no other symptoms.      PROCEDURE:  I used a 0-degree nasal endoscope to gain access into the nasal cavity.  I removed abundant sinonasal crust from the sinonasal passages.  After removing the sinonasal crust, I did spot an abnormality right at the level of the area of the descending palatine nerve.  This is a papillomatous lesion.  I also spotted another lesion immediately above this that will correspond to the pterygopalatine fossa.  This is covered by mucosa but is slight bulging out in this region.  With that in mind, I did a biopsy of this area.  I took about three pieces.  After that, I did cauterize the area that was bleeding.  This was easily controlled.  The patient tolerated the biopsy with no problems.      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  Today, biopsies were obtained from an area that is suspicious for recurrence.  We will be chart checking those pathology  results.           Again, thank you for allowing me to participate in the care of your patient.      Sincerely,    Raghav Faustin MD

## 2017-04-20 NOTE — MR AVS SNAPSHOT
After Visit Summary   4/20/2017    Luiz Ballesteros    MRN: [id number]           Patient Information     Date Of Birth          1965        Visit Information        Provider Department      4/20/2017 2:20 PM Raghav Faustin MD [facility] Ear Nose and Throat        Care Instructions    1.  You were seen in the ENT Clinic today by Dr. Faustin.  If you have questions or concerns after your appointment please call, [phone].  Press option #1 for scheduling related needs.  Press option #3 for Nurse advice.  2.  Plan is to call you with your pathology results.  This process will take 5-7 days.  3. RN Care Coordinator is Noris [phone]  4.  Avoid heavy nose blowing for the next 24 hours.  If your nose bleeds, okay to use Afrin for temporary relief.          Follow-ups after your visit        Your next 10 appointments already scheduled     May 15, 2017  1:00 PM CDT   RETURN RETINA with Lindsey Perez MD   Eye Clinic ([facility])    [address]  [address] [facility]  [address] Clin 9a  [facility] [id number] [phone]            Aug 10, 2017 10:00 AM CDT   (Arrive by 9:45 AM)   MR ORBIT FACE NECK W/O & W CONTRAST with [address] [facility] MRI ([facility])    [address] [facility] [address]  1st [facility] [id number] [phone]           Take your medicines as usual, unless your doctor tells you not to. Bring a list of your current medicines to your exam (including vitamins, minerals and over-the-counter drugs).  You will be given intravenous contrast for this exam. To prepare:   The day before your exam, drink extra fluids at least six 8-ounce glasses (unless your doctor tells you to restrict your fluids).   Have a blood test (creatinine test) within 30 days of your exam. Go to your clinic or Diagnostic Imaging Department for this test.  The MRI machine uses a strong magnet. Please wear  clothes without metal (snaps, zippers). A sweatsuit works well, or we may give you a hospital gown.  Please remove any body piercings and hair extensions before you arrive. You will also remove watches, jewelry, hairpins, wallets, dentures, partial dental plates and hearing aids. You may wear contact lenses, and you may be able to wear your rings. We have a safe place to keep your personal items, but it is safer to leave them at home.   **IMPORTANT** THE INSTRUCTIONS BELOW ARE ONLY FOR THOSE PATIENTS WHO HAVE BEEN TOLD THEY WILL RECEIVE SEDATION OR GENERAL ANESTHESIA DURING THEIR MRI PROCEDURE:  IF YOU WILL RECEIVE SEDATION (take medicine to help you relax during your exam):   You must get the medicine from your doctor before you arrive. Bring the medicine to the exam. Do not take it at home.   Arrive one hour early. Bring someone who can take you home after the test. Your medicine will make you sleepy. After the exam, you may not drive, take a bus or take a taxi by yourself.   No eating 8 hours before your exam. You may have clear liquids up until 4 hours before your exam. (Clear liquids include water, clear tea, black coffee and fruit juice without pulp.)  IF YOU WILL RECEIVE ANESTHESIA (be asleep for your exam):   Arrive 1 1/2 hours early. Bring someone who can take you home after the test. You may not drive, take a bus or take a taxi by yourself.   No eating 8 hours before your exam. You may have clear liquids up until 4 hours before your exam. (Clear liquids include water, clear tea, black coffee and fruit juice without pulp.)  Please call the Imaging Department at your exam site with any questions.            Nov 07, 2017  9:45 AM CST   VISUAL FIELD with [facility] EYE VISUAL FIELD   Eye Clinic ([facility])    [address]  [address] [facility]  9th Fl Clin 9a  [facility] [address]   [phone]            Nov 07, 2017 10:15 AM CST   RETURN GLAUCOMA with Jennyfer Mccall MD   Eye Clinic ([facility]  [facility])    [address] [facility]  [address] [facility] [address]  9th Fl Clin 9a  [facility] [address] [phone]              Who to contact     Please call your clinic at [phone] to:    Ask questions about your health    Make or cancel appointments    Discuss your medicines    Learn about your test results    Speak to your doctor   If you have compliments or concerns about an experience at your clinic, or if you wish to file a complaint, please contact [facility] Patient Relations at [phone] or email us at [email or web address]         Additional Information About Your Visit        [employer]: [place] Information     [employer] gives you secure access to your electronic health record. If you see a primary care provider, you can also send messages to your care team and make appointments. If you have questions, please call your primary care clinic.  If you do not have a primary care provider, please call [phone] and they will assist you.      [employer] is an electronic gateway that provides easy, online access to your medical records. With [employer], you can request a clinic appointment, read your test results, renew a prescription or communicate with your care team.     To access your existing account, please contact your [facility] Clinic or call [phone] for assistance.        Care EveryWhere ID     This is your Care EveryWhere ID. This could be used by other organizations to access your [place] medical records  [id number]        Your Vitals Were     BMI (Body Mass Index)                   39.14 kg/m2            Blood Pressure from Last 3 Encounters:   12/27/16 128/77   11/16/16 119/60   10/24/16 108/72    Weight from Last 3 Encounters:   04/20/17 (!) 138.3 kg (305 lb)   03/14/17 127 kg (280 lb)   02/09/17 127 kg (280 lb)              Today, you had the following     No orders found for display       Primary Care Provider Office Phone # Fax #     Christopher Gaytan [phone]       PRIMARY CARE CENTER [address] [place] [address] 88  [facility] [address]        Thank you!     Thank you for choosing [facility] EAR NOSE AND THROAT  for your care. Our goal is always to provide you with excellent care. Hearing back from our patients is one way we can continue to improve our services. Please take a few minutes to complete the written survey that you may receive in the mail after your visit with us. Thank you!             Your Updated Medication List - Protect others around you: Learn how to safely use, store and throw away your medicines at www.disposemymeds.org.          This list is accurate as of: 4/20/17  3:17 PM.  Always use your most recent med list.                   Brand Name Dispense Instructions for use    amLODIPine 10 MG tablet    NORVASC    30 tablet    Take 1 tablet (10 mg) by mouth daily       busPIRone 10 MG tablet    BUSPAR         latanoprost 0.005 % ophthalmic solution    XALATAN    1 Bottle    Place 1 drop into both eyes At Bedtime       lisinopril 40 MG tablet    PRINIVIL/ZESTRIL    90 tablet    Take 1 tablet (40 mg) by mouth daily       methylphenidate 10 MG tablet    RITALIN         ofloxacin 0.3 % ophthalmic solution    OCUFLOX         * ofloxacin 0.3 % otic solution    FLOXIN    10 mL    Instill into affected ear/s:  3gtts BID x 7 days       * ofloxacin 0.3 % otic solution    FLOXIN    10 mL    Place 3gtts into affected ear TID x 7 days, then BID x 7 days, then DAILY x 7 days, then stop       omeprazole 40 MG capsule    priLOSEC    90 capsule    Take 1 capsule (40 mg) by mouth daily       * order for DME      Use your CPAP device as directed by your provider.       * order for DME     2 Device    Equipment being ordered: double strap ankle brace- XL for right and left ankle       * order for DME     1 Device    Equipment being ordered: CMC brace, LEFT, XL, $28       * timolol 0.5 % ophthalmic solution    TIMOPTIC    1 Bottle     Place 1 drop into both eyes daily       * timolol 0.5 % ophthalmic gel-form    TIMOPTIC-XE    5 mL    Place 1 drop into both eyes daily       traZODone 50 MG tablet    DESYREL         * venlafaxine 150 MG 24 hr capsule    EFFEXOR-XR         * venlafaxine 75 MG 24 hr capsule    EFFEXOR-XR         * Notice:  This list has 9 medication(s) that are the same as other medications prescribed for you. Read the directions carefully, and ask your doctor or other care provider to review them with you.

## 2017-04-24 LAB — COPATH REPORT: NORMAL

## 2017-04-25 ENCOUNTER — CARE COORDINATION (OUTPATIENT)
Dept: OTOLARYNGOLOGY | Facility: CLINIC | Age: 52
End: 2017-04-25

## 2017-04-25 NOTE — PROGRESS NOTES
The patient was called today with his recent pathology results, negative for malignancy.    Right sinonasal cavity:   - Necrotic and inflammatory debris consistent with ulceration        - Hyperplastic squamous mucosa with atypia consistent with   reactive/regenerative change        Plan to RTC in 2 months for another recheck. Luiz voiced an understanding of this information and plan.    Noris Pastor R.N., B.S.N.  Nurse Coordinator Head & Neck Surgery  [phone]  4/25/2017 9:41 AM

## 2017-04-28 ENCOUNTER — TELEPHONE (OUTPATIENT)
Dept: OTOLARYNGOLOGY | Facility: CLINIC | Age: 52
End: 2017-04-28

## 2017-04-28 DIAGNOSIS — J01.01 ACUTE RECURRENT MAXILLARY SINUSITIS: Primary | ICD-10-CM

## 2017-04-28 RX ORDER — LEVOFLOXACIN 500 MG/1
500 TABLET, FILM COATED ORAL DAILY
Qty: 10 TABLET | Refills: 0 | Status: SHIPPED | OUTPATIENT
Start: 2017-04-28 | End: 2017-05-08

## 2017-04-28 NOTE — TELEPHONE ENCOUNTER
"Patient calls with complaints of swelling RIGHT at cheek/ facial and above and along gumline/ painful/tender and trismus    Recent biopsy as follows: \"After removing the sinonasal crust, I did spot an abnormality right at the level of the area of the descending palatine nerve. This is a papillomatous lesion. I also spotted another lesion immediately above this that will correspond to the pterygopalatine fossa. This is covered by mucosa but is slight bulging out in this region. With that in mind, I did a biopsy of this area\"    Necrotic and inflammatory debris consistent with ulceration        - Hyperplastic squamous mucosa with atypia consistent with   reactive/regenerative change     Allergies to Augmentin( Clavulanic acid portion)and Sulfa drugs    Will contact Dr Potter for plan.    Dr. Potter recommends Levaquin 500 mg x 10 days and frequent nasal saline irrigations daily to remove debris and decrease bacterial load. See this week with Dr Potter if needed, if worsening and not improving over weekend, ER/ on call MD. Prescription sent to [facility] pharmacy.  "

## 2017-05-04 ENCOUNTER — OFFICE VISIT (OUTPATIENT)
Dept: OTOLARYNGOLOGY | Facility: CLINIC | Age: 52
End: 2017-05-04

## 2017-05-04 VITALS — WEIGHT: 302 LBS | BODY MASS INDEX: 38.76 KG/M2

## 2017-05-04 DIAGNOSIS — C76.0 ADENOID CYSTIC CARCINOMA OF HEAD AND NECK (H): Primary | ICD-10-CM

## 2017-05-04 ASSESSMENT — PAIN SCALES - GENERAL: PAINLEVEL: NO PAIN (0)

## 2017-05-04 NOTE — NURSING NOTE
Chief Complaint   Patient presents with     RECHECK     f/u     Shanna Ariasfer Medical Assistant

## 2017-05-04 NOTE — MR AVS SNAPSHOT
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  You were seen in the ENT Clinic today by Dr. Faustin.  If you have questions or concerns after your appointment please call, [phone].  Press option #1 for scheduling related needs.  Press option #3 for Nurse advice.  2.  Plan is to have you start Aleksandar's solution for intranasal irrigations.  This will be mailed to you.  3. RN Care Coordinator is Noris [phone]  4.  Please return to clinic on 5/25 at 2:40 pm          Follow-ups after your visit        Your next 10 appointments already scheduled     May 15, 2017  1:00 PM CDT   RETURN GENERAL with Fazal Caldwell MD   Eye Clinic ([facility])    [address] Clin [address] [place] [address] [phone]            May 25, 2017  2:40 PM CDT   (Arrive by 2:25 PM)   Return Visit with Raghav Faustin MD   [facility] Ear Nose and Throat ([facility])    [address] [facility]  4th [facility] [id number] [phone]            Jun 29, 2017  4:00 PM CDT   (Arrive by 3:45 PM)   Return Visit with Raghav Faustin MD   [facility] Ear Nose and Throat ([facility])    [address] [facility]  4th [facility] [id number] [phone]            Aug 10, 2017 10:00 AM CDT   (Arrive by 9:45 AM)   MR ORBIT FACE NECK W/O & W CONTRAST with [address] [facility] ([facility])    [address] [facility]  1st [facility] [id number] [phone]           Take your medicines as usual,  unless your doctor tells you not to. Bring a list of your current medicines to your exam (including vitamins, minerals and over-the-counter drugs).  You will be given intravenous contrast for this exam. To prepare:   The day before your exam, drink extra fluids at least six 8-ounce glasses (unless your doctor tells you to restrict your fluids).   Have a blood test (creatinine test) within 30 days of your exam. Go to your clinic or Diagnostic Imaging Department for this test.  The MRI machine uses a strong magnet. Please wear clothes without metal (snaps, zippers). A sweatsuit works well, or we may give you a hospital gown.  Please remove any body piercings and hair extensions before you arrive. You will also remove watches, jewelry, hairpins, wallets, dentures, partial dental plates and hearing aids. You may wear contact lenses, and you may be able to wear your rings. We have a safe place to keep your personal items, but it is safer to leave them at home.   **IMPORTANT** THE INSTRUCTIONS BELOW ARE ONLY FOR THOSE PATIENTS WHO HAVE BEEN TOLD THEY WILL RECEIVE SEDATION OR GENERAL ANESTHESIA DURING THEIR MRI PROCEDURE:  IF YOU WILL RECEIVE SEDATION (take medicine to help you relax during your exam):   You must get the medicine from your doctor before you arrive. Bring the medicine to the exam. Do not take it at home.   Arrive one hour early. Bring someone who can take you home after the test. Your medicine will make you sleepy. After the exam, you may not drive, take a bus or take a taxi by yourself.   No eating 8 hours before your exam. You may have clear liquids up until 4 hours before your exam. (Clear liquids include water, clear tea, black coffee and fruit juice without pulp.)  IF YOU WILL RECEIVE ANESTHESIA (be asleep for your exam):   Arrive 1 1/2 hours early. Bring someone who can take you home after the test. You may not drive, take a bus or take a taxi by yourself.   No eating 8 hours before your exam. You may  have clear liquids up until 4 hours before your exam. (Clear liquids include water, clear tea, black coffee and fruit juice without pulp.)  Please call the Imaging Department at your exam site with any questions.            Nov 07, 2017  9:45 AM CST   VISUAL FIELD with [facility] EYE VISUAL FIELD   Eye Clinic ([facility])    [address]  [address]   9th Fl Clin 9a  [facility] [address]   [phone]            Nov 07, 2017 10:15 AM CST   RETURN GLAUCOMA with Jennyfer Mccall MD   Eye Clinic ([facility])    [address]  [address]   9th Fl Clin 9a  [facility] [address]   [phone]              Who to contact     Please call your clinic at [phone] to:    Ask questions about your health    Make or cancel appointments    Discuss your medicines    Learn about your test results    Speak to your doctor   If you have compliments or concerns about an experience at your clinic, or if you wish to file a complaint, please contact [facility] Patient Relations at [phone] or email us at [email or web address]         Additional Information About Your Visit        [employer] Information     [employer] gives you secure access to your electronic health record. If you see a primary care provider, you can also send messages to your care team and make appointments. If you have questions, please call your primary care clinic.  If you do not have a primary care provider, please call [phone] and they will assist you.      [employer] is an electronic gateway that provides easy, online access to your medical records. With [employer], you can request a clinic appointment, read your test results, renew a prescription or communicate with your care team.     To access your existing account, please contact your [facility] Clinic or call [phone] for assistance.        Care EveryWhere ID     This is your Care EveryWhere ID.  This could be used by other organizations to access your [facility] medical records  [id number]        Your Vitals Were     BMI (Body Mass Index)                   38.76 kg/m2            Blood Pressure from Last 3 Encounters:   12/27/16 128/77   11/16/16 119/60   10/24/16 108/72    Weight from Last 3 Encounters:   05/04/17 (!) 137 kg (302 lb)   04/20/17 (!) 138.3 kg (305 lb)   03/14/17 127 kg (280 lb)              Today, you had the following     No orders found for display         Today's Medication Changes          These changes are accurate as of: 5/4/17 11:59 PM.  If you have any questions, ask your nurse or doctor.               Start taking these medicines.        Dose/Directions    gentamicin 0.008% in 1000ml 0.9% NaCl Soln nasal irrigation   Commonly known as:  GARAMYCIN   Started by:  Raghav Faustin MD        Dose:  30 mL   Spray 30 mLs in nostril 2 times daily for 14 days Please mail to patient's home address.   Quantity:  840 mL   Refills:  0            Where to get your medicines      These medications were sent to [facility] [place], [place] - [address] [facility] [phone]  [address] [facility] [address] [place], [place] [id number]    Hours:  TRANSPLANT PHONE NUMBER [phone] Phone:  [phone]     gentamicin 0.008% in 1000ml 0.9% NaCl Soln nasal irrigation                Primary Care Provider Office Phone # Fax #    Christopher Gaytan [phone]       PRIMARY CARE CENTER [address] [place] [id number]        Thank you!     Thank you for choosing [facility] EAR NOSE AND THROAT  for your care. Our goal is always to provide you with excellent care. Hearing back from our patients is one way we can continue to improve our services. Please take a few minutes to complete the written survey that you may receive in the mail after your visit with us. Thank you!             Your Updated Medication List - Protect others around you: Learn how to safely  use, store and throw away your medicines at www.disposemymeds.org.          This list is accurate as of: 5/4/17 11:59 PM.  Always use your most recent med list.                   Brand Name Dispense Instructions for use    amLODIPine 10 MG tablet    NORVASC    30 tablet    Take 1 tablet (10 mg) by mouth daily       busPIRone 10 MG tablet    BUSPAR         gentamicin 0.008% in 1000ml 0.9% NaCl Soln nasal irrigation    GARAMYCIN    840 mL    Spray 30 mLs in nostril 2 times daily for 14 days Please mail to patient's home address.       latanoprost 0.005 % ophthalmic solution    XALATAN    1 Bottle    Place 1 drop into both eyes At Bedtime       levofloxacin 500 MG tablet    LEVAQUIN    10 tablet    Take 1 tablet (500 mg) by mouth daily for 10 days       lisinopril 40 MG tablet    PRINIVIL/ZESTRIL    90 tablet    Take 1 tablet (40 mg) by mouth daily       methylphenidate 10 MG tablet    RITALIN         ofloxacin 0.3 % ophthalmic solution    OCUFLOX         * ofloxacin 0.3 % otic solution    FLOXIN    10 mL    Instill into affected ear/s:  3gtts BID x 7 days       * ofloxacin 0.3 % otic solution    FLOXIN    10 mL    Place 3gtts into affected ear TID x 7 days, then BID x 7 days, then DAILY x 7 days, then stop       omeprazole 40 MG capsule    priLOSEC    90 capsule    Take 1 capsule (40 mg) by mouth daily       * order for DME      Use your CPAP device as directed by your provider.       * order for DME     2 Device    Equipment being ordered: double strap ankle brace- XL for right and left ankle       * order for DME     1 Device    Equipment being ordered: CMC brace, LEFT, XL, $28       * timolol 0.5 % ophthalmic solution    TIMOPTIC    1 Bottle    Place 1 drop into both eyes daily       * timolol 0.5 % ophthalmic gel-form    TIMOPTIC-XE    5 mL    Place 1 drop into both eyes daily       traZODone 50 MG tablet    DESYREL         * venlafaxine 150 MG 24 hr capsule    EFFEXOR-XR         * venlafaxine 75 MG 24 hr capsule     EFFEXOR-XR         * Notice:  This list has 9 medication(s) that are the same as other medications prescribed for you. Read the directions carefully, and ask your doctor or other care provider to review them with you.

## 2017-05-04 NOTE — PROGRESS NOTES
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      TREATMENT:  The patient underwent transnasal endoscopic resection on 02/02/2015 followed by postoperative adjuvant proton beam radiation therapy and IMRT at [facility].  He finished treatment on 05/20/2015.      HISTORY OF PRESENT ILLNESS:  Mr. Ballesteros is a 52-year-old gentleman.  He is back today for a followup.  He had a sinonasal biopsy on 04/20/2017.  This came back as necrotic and inflammatory debris consistent with ulceration and hyperplastic mucosa with tPA consistent with reactive regenerative change.  The patient states that about 4-5 days after the biopsy, he developed right-sided facial swelling, some erythema and pain.  I did start him on Levaquin 500 mg p.o. q. day.  The patient states that the symptoms have improved.  He still has some very minimal discomfort on the right region of the maxillary sinus.        PHYSICAL EXAMINATION:   Constitutional: The patient was well-groomed and in no acute distress.   Skin: Warm and pink.   Neurologic: Alert and oriented x3. Cranial nerves III-XII within normal limits. Voice quality is normal.   Psychiatric: The patient's affect was calm, cooperative and appropriate.   Respiratory: Breathing comfortably without stridor or exertion of accessory muscles.   Eyes: Pupils were equal and reactive. Extraocular movements are intact.   Head: Normocephalic and atraumatic. No lesions or scars.   Ears: External auditory canals and tympanic membranes were clear.   Nose: Sinuses were nontender. Anterior rhinoscopy revealed midline septum and absence of purulence or polyps.   Oral cavity/Oropharynx: Normal tongue, floor of mouth, buccal mucosa and palate. No lesions or masses on inspection or palpation. No abnormal lymph tissue in the oropharynx.   Neck: The parotids are soft without masses. Supple with normal laryngeal and tracheal landmarks.   Lymphatic: There is no palpable  lymphadenopathy or other masses in the neck or parotids.     NASAL ENDOSCOPY:  I removed abundant sinonasal crust.  There is a small amount of exposed bone in the area where I did biopsy a few weeks ago.  This small exposed bone might be contributing to the amount of crusting.  Other than that, no changes since I saw him last time on the endoscopic examination.      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  Last biopsy from 04/20/2017 was negative for malignancy.  I want to start him on Aleksandar's solution and I would like to see him back in 3 weeks.

## 2017-05-04 NOTE — PATIENT INSTRUCTIONS
1.  You were seen in the ENT Clinic today by Dr. Faustin.  If you have questions or concerns after your appointment please call, [phone].  Press option #1 for scheduling related needs.  Press option #3 for Nurse advice.  2.  Plan is to have you start Aleksandar's solution for intranasal irrigations.  This will be mailed to you.  3. RN Care Coordinator is Noris [phone]  4.  Please return to clinic on 5/25 at 2:40 pm

## 2017-05-04 NOTE — LETTER
5/4/2017       RE: Luiz Ballesteros  [address] [place] [address]     Dear Colleague,    Thank you for referring your patient, Luiz Ballesteros, to the [facility] EAR NOSE AND THROAT at [facility]. Please see a copy of my visit note below.    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      TREATMENT:  The patient underwent transnasal endoscopic resection on 02/02/2015 followed by postoperative adjuvant [place] [facility] [place]  He finished treatment on 05/20/2015.      HISTORY OF PRESENT ILLNESS:  Mr. Ballesteros is a 52-year-old gentleman.  He is back today for a followup.  He had a sinonasal biopsy on 04/20/2017.  This came back as necrotic and inflammatory debris consistent with ulceration and hyperplastic mucosa with tPA consistent with reactive regenerative change.  The patient states that about 4-5 days after the biopsy, he developed right-sided facial swelling, some erythema and pain.  I did start him on Levaquin 500 mg p.o. q. day.  The patient states that the symptoms have improved.  He still has some very minimal discomfort on the right region of the maxillary sinus.        PHYSICAL EXAMINATION:   Constitutional: The patient was well-groomed and in no acute distress.   Skin: Warm and pink.   Neurologic: Alert and oriented x3. Cranial nerves III-XII within normal limits. Voice quality is normal.   Psychiatric: The patient's affect was calm, cooperative and appropriate.   Respiratory: Breathing comfortably without stridor or exertion of accessory muscles.   Eyes: Pupils were equal and reactive. Extraocular movements are intact.   Head: Normocephalic and atraumatic. No lesions or scars.   Ears: External auditory canals and tympanic membranes were clear.   Nose: Sinuses were nontender. Anterior rhinoscopy revealed midline septum and absence of purulence or polyps.   Oral  cavity/Oropharynx: Normal tongue, floor of mouth, buccal mucosa and palate. No lesions or masses on inspection or palpation. No abnormal lymph tissue in the oropharynx.   Neck: The parotids are soft without masses. Supple with normal laryngeal and tracheal landmarks.   Lymphatic: There is no palpable lymphadenopathy or other masses in the neck or parotids.     NASAL ENDOSCOPY:  I removed abundant sinonasal crust.  There is a small amount of exposed bone in the area where I did biopsy a few weeks ago.  This small exposed bone might be contributing to the amount of crusting.  Other than that, no changes since I saw him last time on the endoscopic examination.      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  Last biopsy from 04/20/2017 was negative for malignancy.  I want to start him on Aleksandar's solution and I would like to see him back in 3 weeks.       Again, thank you for allowing me to participate in the care of your patient.      Sincerely,    Raghav Faustin MD

## 2017-05-11 ENCOUNTER — TELEPHONE (OUTPATIENT)
Dept: OTOLARYNGOLOGY | Facility: CLINIC | Age: 52
End: 2017-05-11

## 2017-05-11 NOTE — TELEPHONE ENCOUNTER
Pt wondering how to instill nasal irrigation solution ordered by Dr. Potter.  RN suggested Anthony-med nasal irrigation bottle and provided verbal instructions on how to instill the solution.    Cinthia Avila BSN, RN  [phone]  [facility]   Head & Neck Surgery

## 2017-05-12 ENCOUNTER — MYC MEDICAL ADVICE (OUTPATIENT)
Dept: OPHTHALMOLOGY | Facility: CLINIC | Age: 52
End: 2017-05-12

## 2017-05-12 DIAGNOSIS — I10 ESSENTIAL HYPERTENSION, BENIGN: ICD-10-CM

## 2017-05-12 DIAGNOSIS — H40.1131 PRIMARY OPEN ANGLE GLAUCOMA OF BOTH EYES, MILD STAGE: Primary | ICD-10-CM

## 2017-05-12 RX ORDER — LATANOPROST 50 UG/ML
1 SOLUTION/ DROPS OPHTHALMIC AT BEDTIME
Qty: 1 BOTTLE | Refills: 11 | Status: SHIPPED | OUTPATIENT
Start: 2017-05-12

## 2017-05-15 ENCOUNTER — OFFICE VISIT (OUTPATIENT)
Dept: OPHTHALMOLOGY | Facility: CLINIC | Age: 52
End: 2017-05-15
Attending: OPHTHALMOLOGY
Payer: COMMERCIAL

## 2017-05-15 DIAGNOSIS — H40.1130 PRIMARY OPEN ANGLE GLAUCOMA OF BOTH EYES, UNSPECIFIED GLAUCOMA STAGE: ICD-10-CM

## 2017-05-15 DIAGNOSIS — Z86.69 H/O RD (RETINAL DETACHMENT): Primary | ICD-10-CM

## 2017-05-15 PROCEDURE — 99212 OFFICE O/P EST SF 10 MIN: CPT | Mod: ZF

## 2017-05-15 RX ORDER — AMLODIPINE BESYLATE 10 MG/1
10 TABLET ORAL DAILY
Qty: 30 TABLET | Refills: 0 | Status: SHIPPED | OUTPATIENT
Start: 2017-05-15 | End: 2017-06-01

## 2017-05-15 ASSESSMENT — CONF VISUAL FIELD
OS_NORMAL: 1
OD_SUPERIOR_TEMPORAL_RESTRICTION: 3
OD_INFERIOR_NASAL_RESTRICTION: 3
OD_SUPERIOR_NASAL_RESTRICTION: 3

## 2017-05-15 ASSESSMENT — TONOMETRY
OD_IOP_MMHG: 10
IOP_UNABLETOASSESS: 1
OS_IOP_MMHG: 11
IOP_METHOD: TONOPEN

## 2017-05-15 ASSESSMENT — SLIT LAMP EXAM - LIDS
COMMENTS: NORMAL
COMMENTS: NORMAL

## 2017-05-15 ASSESSMENT — REFRACTION_WEARINGRX
OD_CYLINDER: +1.75
OD_AXIS: 145
OS_ADD: +2.50
SPECS_TYPE: PAL
OS_CYLINDER: +1.00
OS_SPHERE: -5.75
OD_ADD: +2.50
OS_AXIS: 075

## 2017-05-15 ASSESSMENT — CUP TO DISC RATIO
OS_RATIO: 0.7
OD_RATIO: 0.7

## 2017-05-15 ASSESSMENT — VISUAL ACUITY
OS_CC+: -1
CORRECTION_TYPE: GLASSES
OD_CC+: +2
OS_CC: 20/20
METHOD: SNELLEN - LINEAR
OD_CC: 20/40

## 2017-05-15 ASSESSMENT — EXTERNAL EXAM - RIGHT EYE: OD_EXAM: ERYTHEMA AND SCALING

## 2017-05-15 ASSESSMENT — EXTERNAL EXAM - LEFT EYE: OS_EXAM: ERYTHEMA AND SCALING

## 2017-05-15 NOTE — MR AVS SNAPSHOT
After Visit Summary   5/15/2017    Luiz Ballesteros    MRN: [id number]           Patient Information     Date Of Birth          1965        Visit Information        Provider Department      5/15/2017 1:00 PM Fazal Caldwell MD [facility]        Today's Diagnoses     H/O RD (retinal detachment)    -  1    Primary open angle glaucoma of both eyes, unspecified glaucoma stage           Follow-ups after your visit        Follow-up notes from your care team     Return in about 6 months (around 11/15/2017) for S/p RD repair OU (distant).      Your next 10 appointments already scheduled     May 25, 2017  2:40 PM CDT   (Arrive by 2:25 PM)   Return Visit with Raghav Faustin MD   [facility] Ear Nose and Throat ([facility])    [address] [place] [id number]   [phone]            Jun 29, 2017  4:00 PM CDT   (Arrive by 3:45 PM)   Return Visit with Raghav Faustin MD   [facility] Ear Nose and Throat ([facility])    [address] [place] [address]   [phone]            Aug 10, 2017 10:00 AM CDT   (Arrive by 9:45 AM)   MR ORBIT FACE NECK W/O & W CONTRAST with [address] MRI ([facility])    [address] [place] [address]   [phone]           Take your medicines as usual, unless your doctor tells you not to. Bring a list of your current medicines to your exam (including vitamins, minerals and over-the-counter drugs).  You will be given intravenous contrast for this exam. To prepare:   The day before your exam, drink extra fluids at least six 8-ounce glasses (unless your doctor tells you to restrict your fluids).   Have a blood test (creatinine test) within 30 days of your exam. Go to your clinic or Diagnostic Imaging Department for this test.  The MRI machine uses a strong magnet. Please wear  clothes without metal (snaps, zippers). A sweatsuit works well, or we may give you a hospital gown.  Please remove any body piercings and hair extensions before you arrive. You will also remove watches, jewelry, hairpins, wallets, dentures, partial dental plates and hearing aids. You may wear contact lenses, and you may be able to wear your rings. We have a safe place to keep your personal items, but it is safer to leave them at home.   **IMPORTANT** THE INSTRUCTIONS BELOW ARE ONLY FOR THOSE PATIENTS WHO HAVE BEEN TOLD THEY WILL RECEIVE SEDATION OR GENERAL ANESTHESIA DURING THEIR MRI PROCEDURE:  IF YOU WILL RECEIVE SEDATION (take medicine to help you relax during your exam):   You must get the medicine from your doctor before you arrive. Bring the medicine to the exam. Do not take it at home.   Arrive one hour early. Bring someone who can take you home after the test. Your medicine will make you sleepy. After the exam, you may not drive, take a bus or take a taxi by yourself.   No eating 8 hours before your exam. You may have clear liquids up until 4 hours before your exam. (Clear liquids include water, clear tea, black coffee and fruit juice without pulp.)  IF YOU WILL RECEIVE ANESTHESIA (be asleep for your exam):   Arrive 1 1/2 hours early. Bring someone who can take you home after the test. You may not drive, take a bus or take a taxi by yourself.   No eating 8 hours before your exam. You may have clear liquids up until 4 hours before your exam. (Clear liquids include water, clear tea, black coffee and fruit juice without pulp.)  Please call the Imaging Department at your exam site with any questions.            Nov 07, 2017  9:45 AM CST   VISUAL FIELD with [facility] EYE VISUAL FIELD   Eye Clinic ([facility])    [address]  [address]  [address]  [facility] [address]   [phone]            Nov 07, 2017 10:15 AM CST   RETURN GLAUCOMA with Jennyfer Mccall MD   Eye Clinic ([facility]  [facility])    [address] [facility]  [address] [facility]  [address]  [facility] [address]   [phone]              Who to contact     Please call your clinic at [phone] to:    Ask questions about your health    Make or cancel appointments    Discuss your medicines    Learn about your test results    Speak to your doctor   If you have compliments or concerns about an experience at your clinic, or if you wish to file a complaint, please contact [facility] Patient Relations at [phone] or email us at [email or web address]         Additional Information About Your Visit        [id number] Information     [employer] gives you secure access to your electronic health record. If you see a primary care provider, you can also send messages to your care team and make appointments. If you have questions, please call your primary care clinic.  If you do not have a primary care provider, please call [phone] and they will assist you.      [employer] is an electronic gateway that provides easy, online access to your medical records. With [employer], you can request a clinic appointment, read your test results, renew a prescription or communicate with your care team.     To access your existing account, please contact your [facility] or call [phone] for assistance.        Care EveryWhere ID     This is your Care EveryWhere ID. This could be used by other organizations to access your [facility] medical records  [id number]         Blood Pressure from Last 3 Encounters:   12/27/16 128/77   11/16/16 119/60   10/24/16 108/72    Weight from Last 3 Encounters:   05/04/17 (!) 137 kg (302 lb)   04/20/17 (!) 138.3 kg (305 lb)   03/14/17 127 kg (280 lb)              Today, you had the following     No orders found for display       Primary Care Provider Office Phone # Fax #    Christopher Gaytan [phone]       [place] [id number]  [facility] [address]  [facility] [address]        Thank you!     Thank you for choosing EYE CLINIC  for your care. Our goal is always to provide you with excellent care. Hearing back from our patients is one way we can continue to improve our services. Please take a few minutes to complete the written survey that you may receive in the mail after your visit with us. Thank you!             Your Updated Medication List - Protect others around you: Learn how to safely use, store and throw away your medicines at www.disposemymeds.org.          This list is accurate as of: 5/15/17  2:08 PM.  Always use your most recent med list.                   Brand Name Dispense Instructions for use    amLODIPine 10 MG tablet    NORVASC    30 tablet    Take 1 tablet (10 mg) by mouth daily       busPIRone 10 MG tablet    BUSPAR         gentamicin 0.008% in 1000ml 0.9% NaCl Soln nasal irrigation    GARAMYCIN    840 mL    Spray 30 mLs in nostril 2 times daily for 14 days Please mail to patient's home address.       latanoprost 0.005 % ophthalmic solution    XALATAN    1 Bottle    Place 1 drop into both eyes At Bedtime       lisinopril 40 MG tablet    PRINIVIL/ZESTRIL    90 tablet    Take 1 tablet (40 mg) by mouth daily       methylphenidate 10 MG tablet    RITALIN         ofloxacin 0.3 % ophthalmic solution    OCUFLOX         * ofloxacin 0.3 % otic solution    FLOXIN    10 mL    Instill into affected ear/s:  3gtts BID x 7 days       * ofloxacin 0.3 % otic solution    FLOXIN    10 mL    Place 3gtts into affected ear TID x 7 days, then BID x 7 days, then DAILY x 7 days, then stop       omeprazole 40 MG capsule    priLOSEC    90 capsule    Take 1 capsule (40 mg) by mouth daily       * order for DME      Use your CPAP device as directed by your provider.       * order for DME     2 Device    Equipment being ordered: double strap ankle brace- XL for right and left ankle       * order for DME     1 Device    Equipment being ordered: CMC brace,  LEFT, XL, $28       * timolol 0.5 % ophthalmic solution    TIMOPTIC    1 Bottle    Place 1 drop into both eyes daily       * timolol 0.5 % ophthalmic gel-form    TIMOPTIC-XE    5 mL    Place 1 drop into both eyes daily       traZODone 50 MG tablet    DESYREL         * venlafaxine 150 MG 24 hr capsule    EFFEXOR-XR         * venlafaxine 75 MG 24 hr capsule    EFFEXOR-XR         * Notice:  This list has 9 medication(s) that are the same as other medications prescribed for you. Read the directions carefully, and ask your doctor or other care provider to review them with you.

## 2017-05-15 NOTE — TELEPHONE ENCOUNTER
amLODIPine       Last Written Prescription Date: 3/17/17  Last Fill Quantity: 30,   # refills: 0  Last Office Visit : 7/28/16  Future Office visit: NONE  BP Readings from Last 3 Encounters:   12/27/16 128/77   11/16/16 119/60   10/24/16 108/72

## 2017-05-15 NOTE — NURSING NOTE
Chief Complaints and History of Present Illnesses   Patient presents with     Follow Up For     H/O RD (retinal detachment) - Both Eyes     HPI    Affected eye(s):  Both   Symptoms:        Duration:  6 months   Frequency:  Constant       Do you have eye pain now?:  No      Comments:  Pt. States that he is doing well.  No change in VA BE.  No flashes or floaters BE.  Priscilla Gorman COT 1:18 PM May 15, 2017

## 2017-05-15 NOTE — PROGRESS NOTES
CC: follow-up visit    Interval History: stable vision, no changes in floaters.    HPI:  53 yo male s/p RD repair ou.  Feels that vision has been stable since last fall. Follows with Dr Mccall for glaucoma.    OCT Macula (March 2017)  RE: normal  LE: noraml    Assessment/plan   1.  S/P RD repair both eyes     No retinal tear or hole today on exam   VA improved OU today from previous   Monitor, return to clinic 6 months or as needed     2. Primary open angle glaucoma (POAG) both eyes    - Follows with Dr. Mccall   - continue QAM timolol and QHS xalatan    3.  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 I have confirmed and edited as necessary the relevant ophthalmic history, ROS, eye exam findings, and the neuro exam findings as obtained by others. I have seen and examined this patient. I was present for critical portions of the procedure carried out by the resident/fellow and immediately available for the entire procedure and I personally viewed the image(s) and I agree with the interpretation as documented by the resident/fellow/or others and edited by me.  Fazal Caldwell MD  Retina Fellow

## 2017-05-23 RX ORDER — CODEINE PHOSPHATE AND GUAIFENESIN 10; 100 MG/5ML; MG/5ML
1 SOLUTION ORAL EVERY 4 HOURS PRN
Qty: 120 ML | Refills: 0 | COMMUNITY
Start: 2017-05-23

## 2017-05-23 NOTE — TELEPHONE ENCOUNTER
guaiFENesin-codeine (ROBITUSSIN AC) 100-10 MG/5ML SOLN (Discontinued) 120 mL 0 10/26/2013 3/10/2014 --   Sig: Take 10 mLs by mouth every 6 hours as needed   Class: Local Print     Patient with continued cough - requesting guaifenesin- codeine cough syrup 120 ml  as previous above. Will see on 5/25.   Order called to [facility].    Encouraged to follow up with Dr Son, too, if this persists after seen by Dr Potter

## 2017-05-25 ENCOUNTER — OFFICE VISIT (OUTPATIENT)
Dept: OTOLARYNGOLOGY | Facility: CLINIC | Age: 52
End: 2017-05-25

## 2017-05-25 VITALS — WEIGHT: 302 LBS | BODY MASS INDEX: 38.76 KG/M2 | HEIGHT: 74 IN

## 2017-05-25 DIAGNOSIS — C76.0 ADENOID CYSTIC CARCINOMA OF HEAD AND NECK (H): Primary | ICD-10-CM

## 2017-05-25 ASSESSMENT — PAIN SCALES - GENERAL: PAINLEVEL: NO PAIN (0)

## 2017-05-25 NOTE — MR AVS SNAPSHOT
After Visit Summary   5/25/2017    Luiz Ballesteros    MRN: [id number]           Patient Information     Date Of Birth          1965        Visit Information        Provider Department      5/25/2017 2:40 PM Raghav Faustin MD [facility]        Care Instructions    1.  You were seen in the ENT Clinic today by Dr. Faustin.  If you have questions or concerns after your appointment please call, [phone].  Press option #1 for scheduling related needs.  Press option #3 for Nurse advice.  2.  Plan is to return to clinic in  6 weeks.  3. RN Care Coordinator is Noris, [phone]  4.  If the area around your missing tooth doesn't heal, please call and let us know  5.  Continue to use the Aleksandar's solution until it's gone.            Follow-ups after your visit        Follow-up notes from your care team     Return in about 6 weeks (around 7/6/2017) for sinonasal carcinoma.      Your next 10 appointments already scheduled     Jun 01, 2017  1:05 PM CDT   (Arrive by 12:50 PM)   Return Visit with Christopher Gaytan MD   [facility] ([facility])    [address] [place] [id number] [phone]            Jun 29, 2017  4:00 PM CDT   (Arrive by 3:45 PM)   Return Visit with Raghav Faustin MD   [facility] ([facility])    [address] [place] [address]   [phone]            Jul 13, 2017  4:00 PM CDT   (Arrive by 3:45 PM)   Return Visit with Raghav Faustin MD   [facility] ([facility])    [address] [place] [address]   [phone]            Aug 10, 2017 10:00 AM CDT   (Arrive by 9:45 AM)   MR ORBIT FACE NECK W/O & W CONTRAST with [address] [facility] ([facility])    [address] [place] [address]  [address] [facility] [address] [phone]           Take your medicines as usual, unless your doctor tells you not to. Bring a list of your current medicines to your exam (including vitamins, minerals and over-the-counter drugs).  You will be given intravenous contrast for this exam. To prepare:   The day before your exam, drink extra fluids at least six 8-ounce glasses (unless your doctor tells you to restrict your fluids).   Have a blood test (creatinine test) within 30 days of your exam. Go to your clinic or Diagnostic Imaging Department for this test.  The MRI machine uses a strong magnet. Please wear clothes without metal (snaps, zippers). A sweatsuit works well, or we may give you a hospital gown.  Please remove any body piercings and hair extensions before you arrive. You will also remove watches, jewelry, hairpins, wallets, dentures, partial dental plates and hearing aids. You may wear contact lenses, and you may be able to wear your rings. We have a safe place to keep your personal items, but it is safer to leave them at home.   **IMPORTANT** THE INSTRUCTIONS BELOW ARE ONLY FOR THOSE PATIENTS WHO HAVE BEEN TOLD THEY WILL RECEIVE SEDATION OR GENERAL ANESTHESIA DURING THEIR MRI PROCEDURE:  IF YOU WILL RECEIVE SEDATION (take medicine to help you relax during your exam):   You must get the medicine from your doctor before you arrive. Bring the medicine to the exam. Do not take it at home.   Arrive one hour early. Bring someone who can take you home after the test. Your medicine will make you sleepy. After the exam, you may not drive, take a bus or take a taxi by yourself.   No eating 8 hours before your exam. You may have clear liquids up until 4 hours before your exam. (Clear liquids include water, clear tea, black coffee and fruit juice without pulp.)  IF YOU WILL RECEIVE ANESTHESIA (be asleep for your exam):   Arrive 1 1/2 hours early. Bring someone who can take you home after the test.  You may not drive, take a bus or take a taxi by yourself.   No eating 8 hours before your exam. You may have clear liquids up until 4 hours before your exam. (Clear liquids include water, clear tea, black coffee and fruit juice without pulp.)  Please call the Imaging Department at your exam site with any questions.            Nov 07, 2017  9:45 AM CST   VISUAL FIELD with [facility] EYE VISUAL FIELD   Eye Clinic ([facility])    [address]  [address] [place] [address]   [phone]            Nov 07, 2017 10:15 AM CST   RETURN GLAUCOMA with Jennyfer Mccall MD   Eye Clinic ([facility])    [address]  [address] [address]   [phone]            Nov 13, 2017 12:30 PM CST   RETURN RETINA with Lindsey Perez MD   Eye Clinic ([facility])    [address]  [address] [address] [phone]              Who to contact     Please call your clinic at [phone] to:    Ask questions about your health    Make or cancel appointments    Discuss your medicines    Learn about your test results    Speak to your doctor   If you have compliments or concerns about an experience at your clinic, or if you wish to file a complaint, please contact [facility] Patient Relations at [phone] or email us at [email or web address]         Additional Information About Your Visit        [employer] Information     [employer] gives you secure access to your electronic health record. If you see a primary care provider, you can also send messages to your care team and make appointments. If you have questions, please call your primary care clinic.  If you do not have a primary care provider, please call [phone] and they will assist you.      [employer] is an electronic gateway that provides easy, online access to your medical records.  "With MyChart, you can request a clinic appointment, read your test results, renew a prescription or communicate with your care team.     To access your existing account, please contact your [facility] or call [phone] for assistance.        Care EveryWhere ID     This is your Care EveryWhere ID. This could be used by other organizations to access your [place] medical records  [id number]        Your Vitals Were     Height BMI (Body Mass Index)                1.88 m (6' 2\") 38.77 kg/m2           Blood Pressure from Last 3 Encounters:   12/27/16 128/77   11/16/16 119/60   10/24/16 108/72    Weight from Last 3 Encounters:   05/25/17 (!) 137 kg (302 lb)   05/04/17 (!) 137 kg (302 lb)   04/20/17 (!) 138.3 kg (305 lb)              Today, you had the following     No orders found for display       Primary Care Provider Office Phone # Fax #    Christopher Gaytan [phone]       [facility] [address] [place] [id number]        Thank you!     Thank you for choosing [facility] E[facility] NOSE [facility] TH[facility]AT  for your care. Our goal is always to provide you with excellent care. Hearing back from our patients is one way we can continue to improve our services. Please take a few minutes to complete the written survey that you may receive in the mail after your visit with us. Thank you!             Your Updated Medication List - Protect others around you: Learn how to safely use, store and throw away your medicines at www.disposemymeds.org.          This list is accurate as of: 5/25/17  3:20 PM.  Always use your most recent med list.                   Brand Name Dispense Instructions for use    amLODIPine 10 MG tablet    NORVASC    30 tablet    Take 1 tablet (10 mg) by mouth daily *MD APPT. FOR REFILLS       busPIRone 10 MG tablet    BUSPAR         guaiFENesin-codeine 100-10 MG/5ML Soln solution    ROBITUSSIN AC    120 mL    Take 5 mLs by mouth every 4 hours as " needed for cough       latanoprost 0.005 % ophthalmic solution    XALATAN    1 Bottle    Place 1 drop into both eyes At Bedtime       lisinopril 40 MG tablet    PRINIVIL/ZESTRIL    90 tablet    Take 1 tablet (40 mg) by mouth daily       methylphenidate 10 MG tablet    RITALIN         ofloxacin 0.3 % ophthalmic solution    OCUFLOX         ofloxacin 0.3 % otic solution    FLOXIN    10 mL    Place 3gtts into affected ear TID x 7 days, then BID x 7 days, then DAILY x 7 days, then stop       omeprazole 40 MG capsule    priLOSEC    90 capsule    Take 1 capsule (40 mg) by mouth daily       * order for DME      Use your CPAP device as directed by your provider.       * order for DME     2 Device    Equipment being ordered: double strap ankle brace- XL for right and left ankle       * order for DME     1 Device    Equipment being ordered: CMC brace, LEFT, XL, $28       * timolol 0.5 % ophthalmic solution    TIMOPTIC    1 Bottle    Place 1 drop into both eyes daily       * timolol 0.5 % ophthalmic gel-form    TIMOPTIC-XE    5 mL    Place 1 drop into both eyes daily       traZODone 50 MG tablet    DESYREL         * venlafaxine 150 MG 24 hr capsule    EFFEXOR-XR         * venlafaxine 75 MG 24 hr capsule    EFFEXOR-XR         * Notice:  This list has 7 medication(s) that are the same as other medications prescribed for you. Read the directions carefully, and ask your doctor or other care provider to review them with you.

## 2017-05-25 NOTE — NURSING NOTE
"Chief Complaint   Patient presents with     RECHECK     Follow up for adnoid carcinoma      Height 1.88 m (6' 2\"), weight (!) 137 kg (302 lb).      Jason Langley    "

## 2017-05-25 NOTE — PATIENT INSTRUCTIONS
1.  You were seen in the ENT Clinic today by Dr. Faustin.  If you have questions or concerns after your appointment please call, [phone].  Press option #1 for scheduling related needs.  Press option #3 for Nurse advice.  2.  Plan is to return to clinic in  6 weeks.  3. RN Care Coordinator is Noris, [phone]  4.  If the area around your missing tooth doesn't heal, please call and let us know  5.  Continue to use the Aleksandar's solution until it's gone.

## 2017-05-25 NOTE — PROGRESS NOTES
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      TREATMENT:  The patient underwent transnasal endoscopic resection on 02/02/2015.  He received postoperative adjuvant proton beam radiation therapy and [place] [facility]  He finished treatment on 05/20/2015.      HISTORY OF PRESENT ILLNESS:  Mr. Ballesteros was evaluated by me the on 05/04/2017.  At that time, I did observe bone exposed at the level of the pterygoid process.  There was some granulation tissue in this area that was already biopsied and came back negative for malignancy.  I did prescribe Aleksandar's solution that he has been using.  The patient states that the secretions have improved.  Also, he had a right upper molar that fell out on its own.  He was evaluated by his dentist.  His dentist did an x-ray and told him that his bone in the maxilla is very thin and that is why the tooth has fallen out.  Of course, I am very concerned about possible osteoradionecrosis versus tumor recurrence.      PHYSICAL EXAMINATION:    Constitutional: The patient was well-groomed and in no acute distress.    Skin: Warm and pink.    Neurologic: Alert and oriented x3. Cranial nerves III-XII within normal limits. Voice quality is normal.    Psychiatric: The patient's affect was calm, cooperative and appropriate.    Respiratory: Breathing comfortably without stridor or exertion of accessory muscles.    Eyes: Pupils were equal and reactive. Extraocular movements are intact.    Head: Normocephalic and atraumatic. No lesions or scars.    Ears: External auditory canals and tympanic membranes were clear.    Nose: Sinuses were nontender. Anterior rhinoscopy revealed midline septum and absence of purulence or polyps.    Oral cavity/Oropharynx: Normal tongue, floor of mouth, buccal mucosa and palate. No lesions or masses on inspection or palpation. No abnormal lymph tissue in the oropharynx.  There is evidence of a tooth socket that is healing on  the right upper maxilla.  The mucosa in this area looks totally normal.  I do not see any evidence of gross tumor.   Neck: The parotids are soft without masses. Supple with normal laryngeal and tracheal landmarks.    Lymphatic: There is no palpable lymphadenopathy or other masses in the neck or parotids.       PROCEDURE: Nasal endoscopy: The risks and benefits of the procedure were discussed and written consent was obtained. A timeout was performed. The patient's nose was sprayed with lidocaine and Afrin, and a 0-degree nasal endoscope was used to gain access into the right sinonasal passages.  I removed abundant sinonasal crusts.  There is still some granulation tissue in the area that I did biopsy a few weeks ago.  There is a small piece of bone exposed in the pterygopalatine region.  I think the amount of crusts and secretions have decreased substantially but there is still a considerable amount of crust.      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  I will continue following him very closely for this possible osteoradionecrosis.  I would like to see him back in six weeks.

## 2017-06-01 ENCOUNTER — OFFICE VISIT (OUTPATIENT)
Dept: FAMILY MEDICINE | Facility: CLINIC | Age: 52
End: 2017-06-01

## 2017-06-01 VITALS
HEART RATE: 90 BPM | TEMPERATURE: 98.4 F | RESPIRATION RATE: 18 BRPM | WEIGHT: 302 LBS | DIASTOLIC BLOOD PRESSURE: 80 MMHG | OXYGEN SATURATION: 97 % | BODY MASS INDEX: 38.77 KG/M2 | SYSTOLIC BLOOD PRESSURE: 119 MMHG

## 2017-06-01 DIAGNOSIS — I10 ESSENTIAL HYPERTENSION: ICD-10-CM

## 2017-06-01 DIAGNOSIS — R05.9 COUGH: ICD-10-CM

## 2017-06-01 DIAGNOSIS — Z11.59 NEED FOR HEPATITIS C SCREENING TEST: Primary | ICD-10-CM

## 2017-06-01 DIAGNOSIS — I10 ESSENTIAL HYPERTENSION, BENIGN: ICD-10-CM

## 2017-06-01 RX ORDER — ALBUTEROL SULFATE 90 UG/1
2 AEROSOL, METERED RESPIRATORY (INHALATION) EVERY 6 HOURS PRN
Qty: 1 INHALER | Refills: 3 | Status: SHIPPED | OUTPATIENT
Start: 2017-06-01 | End: 2018-10-09

## 2017-06-01 RX ORDER — AMLODIPINE BESYLATE 10 MG/1
10 TABLET ORAL DAILY
Qty: 90 TABLET | Refills: 3 | Status: SHIPPED | OUTPATIENT
Start: 2017-06-01

## 2017-06-01 RX ORDER — LISINOPRIL 40 MG/1
40 TABLET ORAL DAILY
Qty: 90 TABLET | Refills: 3 | Status: SHIPPED | OUTPATIENT
Start: 2017-06-01

## 2017-06-01 RX ORDER — AZITHROMYCIN 250 MG/1
TABLET, FILM COATED ORAL
Qty: 6 TABLET | Refills: 0 | Status: SHIPPED | OUTPATIENT
Start: 2017-06-01

## 2017-06-01 ASSESSMENT — PAIN SCALES - GENERAL: PAINLEVEL: NO PAIN (0)

## 2017-06-01 NOTE — PROGRESS NOTES
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    Here for a cough. No URI sx or dyspnea.  Two weeks. No fever but is tired. No pulm history, no smoker.   Before this, had sinus infection, treated by ENT w/ levaquin, that cleared up  Does get seasonal allergies, takes zyrtec, helps, told by ENT to minimize as could aggravate his sinus mucosal dysfunction of the sinus treated for cancer  He had a tooth (implant) fall out during sinusitis, told might have osteonecrosis, following closely w/ ENT, notes rvwd    Due for tetanus shot late this year, he knows    Colonoscoy up to date    Htn: good control on well tolerated meds, due for labs    Past Medical History:   Diagnosis Date     Carcinoma of paranasal sinus (H) 2016    Right sinonasal skull base adenocystic carcinoma, T4b, N0, M0     Gastro-oesophageal reflux disease      Glaucoma     early OAG     Hypertension      Nonsenile cataract      DEVONTE (obstructive sleep apnea) 2008     Retinal detachment     BE     Strabismus      Past Surgical History:   Procedure Laterality Date     CATARACT IOL, RT/LT  2005    RE CE/IOL/SV/PPV     CATARACT IOL, RT/LT  6/10/2009    LE     COLONOSCOPY  7/24/14     COLONOSCOPY  7/24/2014    Procedure: COLONOSCOPY;  Surgeon: Stephen Dempsey MD;  Location:  GI     ENT SURGERY      sinus surgery and septoplasty     EYE SURGERY  2005    LE     EYE SURGERY       NASAL/SINUS  POLYPECTOMY  1995     OPTICAL TRACKING SYSTEM ENDOSCOPIC ENDONASAL SURGERY N/A 2/2/2015    Procedure: OPTICAL TRACKING SYSTEM ENDOSCOPIC ENDONASAL SURGERY;  Surgeon: Raghav Faustin MD;  Location:  OR     OPTICAL TRACKING SYSTEM ENDOSCOPIC SINUS SURGERY N/A 1/19/2015    Procedure: OPTICAL TRACKING SYSTEM ENDOSCOPIC SINUS SURGERY;  Surgeon: Raghav Faustin MD;  Location:  OR     ORTHOPEDIC SURGERY      elbow surgery     PE TUBES  2016     RETINAL REATTACHMENT  2004    retinal tear repair RE     STRABISMUS SURGERY  2004    LE                 Allergies   Allergen Reactions     Augmentin Rash     Clavulanic Acid      augmentin, but pt has no reaction to just penicillin     Sulfa Drugs Swelling           Current Outpatient Prescriptions   Medication Sig Dispense Refill     amLODIPine (NORVASC) 10 MG tablet Take 1 tablet (10 mg) by mouth daily 90 tablet 3     lisinopril (PRINIVIL/ZESTRIL) 40 MG tablet Take 1 tablet (40 mg) by mouth daily 90 tablet 3     albuterol (PROAIR HFA/PROVENTIL HFA/VENTOLIN HFA) 108 (90 BASE) MCG/ACT Inhaler Inhale 2 puffs into the lungs every 6 hours as needed for shortness of breath / dyspnea or wheezing 1 Inhaler 3     azithromycin (ZITHROMAX) 250 MG tablet Two tablets first day, then one tablet daily for four days. 6 tablet 0     guaiFENesin-codeine (ROBITUSSIN AC) 100-10 MG/5ML SOLN solution Take 5 mLs by mouth every 4 hours as needed for cough 120 mL 0     latanoprost (XALATAN) 0.005 % ophthalmic solution Place 1 drop into both eyes At Bedtime 1 Bottle 11     ofloxacin (FLOXIN) 0.3 % otic solution Place 3gtts into affected ear TID x 7 days, then BID x 7 days, then DAILY x 7 days, then stop 10 mL 0     order for DME Equipment being ordered: CMC brace, LEFT, XL, $28 1 Device 0     timolol (TIMOPTIC-XE) 0.5 % ophthalmic gel-forming Place 1 drop into both eyes daily 5 mL 11     omeprazole (PRILOSEC) 40 MG capsule Take 1 capsule (40 mg) by mouth daily 90 capsule 3      busPIRone (BUSPAR) 10 MG tablet        methylphenidate (RITALIN) 10 MG tablet        ofloxacin (OCUFLOX) 0.3 % ophthalmic solution        traZODone (DESYREL) 50 MG tablet        venlafaxine (EFFEXOR-XR) 150 MG 24 hr capsule        venlafaxine (EFFEXOR-XR) 75 MG 24 hr capsule        order for DME Equipment being ordered: double strap ankle brace- XL for right and left ankle 2 Device 0     ORDER FOR DME Use your CPAP device as directed by your provider.       timolol (TIMOPTIC) 0.5 % ophthalmic solution Place 1 drop into both eyes daily 1 Bottle 11     [DISCONTINUED] amLODIPine (NORVASC) 10 MG tablet Take 1 tablet (10 mg) by mouth daily *MD APPT. FOR REFILLS 30 tablet 0     [DISCONTINUED] lisinopril (PRINIVIL,ZESTRIL) 40 MG tablet Take 1 tablet (40 mg) by mouth daily 90 tablet 2       Social History   Substance Use Topics     Smoking status: Never Smoker     Smokeless tobacco: Never Used     Alcohol use No           OBJECTIVE:  /80 (BP Location: Right arm, Patient Position: Chair, Cuff Size: Adult Large)  Pulse 90  Temp 98.4  F (36.9  C) (Oral)  Resp 18  Wt (!) 137 kg (302 lb)  SpO2 97%  BMI 38.77 kg/m2  GENERAL APPEARANCE: Alert, no acute distress  RESP: lungs clear to auscultation   CV: normal rate, regular rhythm, no murmur or gallop    MS: extremities normal, no peripheral edema  NEURO: Alert, oriented, speech and mentation normal  PSYCHE: mentation appears normal, affect and mood normal    ASSESSMENT/PLAN:    Cough: zpak, prn proair for sx, f/u prn. He is on ACE, for a long time  If sx persist, consider CXR, hold ACE    Htn; labs, as is    DT late this year    He might move to [facility] soon    Follows w/ ENT for possible osteonecrosis and sinus cancer        LUIS A DE LA VEGA MD

## 2017-06-01 NOTE — NURSING NOTE
Chief Complaint   Patient presents with     Cough     Patient is here for ongoing cough, duration 2 weeks     Shireen Cheng CMA 1:02 PM on 6/1/2017.

## 2017-06-01 NOTE — MR AVS SNAPSHOT
After Visit Summary   6/1/2017    Luiz Ballesteros    MRN: [id number]           Patient Information     Date Of Birth          1965        Visit Information        Provider Department      6/1/2017 1:05 PM Christopher Gaytan MD [facility]        Today's Diagnoses     Need for hepatitis C screening test    -  1    Essential hypertension, benign        Essential hypertension        Cough          Care Instructions    Primary Care Center Medication Refill Request Information:  * Please contact your pharmacy regarding ANY request for medication refills.  ** [facility] Prescription Fax = [phone]  * Please allow 3 business days for routine medication refills.  * Please allow 5 business days for controlled substance medication refills.     Primary Care Center Test Result notification information:  *You will be notified with in 7-10 days of your appointment day regarding the results of your test.  If you are on MyChart you will be notified as soon as the provider has reviewed the results and signed off on them.    Primary Care Center [phone]             Follow-ups after your visit        Your next 10 appointments already scheduled     Jun 02, 2017  9:00 AM CDT   LAB with [facility] ([facility])    [address] [place] [address] [phone]           Patient must bring picture ID.  Patient should be prepared to give a urine specimen  Please do not eat 10-12 hours before your appointment if you are coming in fasting for labs on lipids, cholesterol, or glucose (sugar).  Pregnant women should follow their Care Team instructions. Water with medications is okay. Do not drink coffee or other fluids.   If you have concerns about taking  your medications, please ask at office or if scheduling via Nextpeer, send a message by clicking on Secure Messaging, Message Your Care Team.            Jun 29, 2017  4:00 PM CDT    (Arrive by 3:45 PM)   Return Visit with Raghav Faustin MD   [facility] ([facility])    [address]  [address] [facility] [address]   [phone]            Jul 13, 2017  4:00 PM CDT   (Arrive by 3:45 PM)   Return Visit with Raghav Faustin MD   [facility] ([facility])    [address]  [address] [facility] [address]   [phone]            Aug 10, 2017 10:00 AM CDT   (Arrive by 9:45 AM)   MR ORBIT FACE NECK W/O & W CONTRAST with [address] MRI ([facility])    [address]  [address] [facility] [address]   [phone]           Take your medicines as usual, unless your doctor tells you not to. Bring a list of your current medicines to your exam (including vitamins, minerals and over-the-counter drugs).  You will be given intravenous contrast for this exam. To prepare:   The day before your exam, drink extra fluids at least six 8-ounce glasses (unless your doctor tells you to restrict your fluids).   Have a blood test (creatinine test) within 30 days of your exam. Go to your clinic or Diagnostic Imaging Department for this test.  The MRI machine uses a strong magnet. Please wear clothes without metal (snaps, zippers). A sweatsuit works well, or we may give you a hospital gown.  Please remove any body piercings and hair extensions before you arrive. You will also remove watches, jewelry, hairpins, wallets, dentures, partial dental plates and hearing aids. You may wear contact lenses, and you may be able to wear your rings. We have a safe place to keep your personal items, but it is safer to leave them at home.   **IMPORTANT** THE INSTRUCTIONS BELOW ARE ONLY FOR THOSE PATIENTS WHO HAVE BEEN TOLD THEY WILL RECEIVE SEDATION OR GENERAL ANESTHESIA DURING THEIR MRI PROCEDURE:  IF YOU WILL RECEIVE SEDATION (take medicine  to help you relax during your exam):   You must get the medicine from your doctor before you arrive. Bring the medicine to the exam. Do not take it at home.   Arrive one hour early. Bring someone who can take you home after the test. Your medicine will make you sleepy. After the exam, you may not drive, take a bus or take a taxi by yourself.   No eating 8 hours before your exam. You may have clear liquids up until 4 hours before your exam. (Clear liquids include water, clear tea, black coffee and fruit juice without pulp.)  IF YOU WILL RECEIVE ANESTHESIA (be asleep for your exam):   Arrive 1 1/2 hours early. Bring someone who can take you home after the test. You may not drive, take a bus or take a taxi by yourself.   No eating 8 hours before your exam. You may have clear liquids up until 4 hours before your exam. (Clear liquids include water, clear tea, black coffee and fruit juice without pulp.)  Please call the Imaging Department at your exam site with any questions.            Nov 07, 2017  9:45 AM CST   VISUAL FIELD with [facility] EYE VISUAL FIELD   Eye Clinic ([facility])    [address]  [address]  [address]  [facility] [address]   [phone]            Nov 07, 2017 10:15 AM CST   RETURN GLAUCOMA with Jennyfer Mccall MD   Eye Clinic ([facility])    [address]  [address]  [address]  [facility] [address]   [phone]            Nov 13, 2017 12:30 PM CST   RETURN RETINA with Lindsey Perez MD   Eye Clinic ([facility])    [address]  [address]  [address] [address]  [facility] [address]   [phone]              Future tests that were ordered for you today     Open Future Orders        Priority Expected Expires Ordered    Hepatitis C Screen Reflex to HCV RNA Quant and Genotype Routine 6/1/2017 6/1/2018 6/1/2017    Lipid panel reflex to direct LDL Routine 6/1/2017 6/15/2017 6/1/2017     Comprehensive metabolic panel Routine 6/1/2017 6/15/2017 6/1/2017            Who to contact     Please call your clinic at [phone] to:    Ask questions about your health    Make or cancel appointments    Discuss your medicines    Learn about your test results    Speak to your doctor   If you have compliments or concerns about an experience at your clinic, or if you wish to file a complaint, please contact [facility] Patient Relations at [phone] or email us at [email or web address]         Additional Information About Your Visit        [employer] Information     [employer] gives you secure access to your electronic health record. If you see a primary care provider, you can also send messages to your care team and make appointments. If you have questions, please call your primary care clinic.  If you do not have a primary care provider, please call [phone] and they will assist you.      [employer] is an electronic gateway that provides easy, online access to your medical records. With [employer], you can request a clinic appointment, read your test results, renew a prescription or communicate with your care team.     To access your existing account, please contact your [facility] Clinic or call [phone] for assistance.        Care EveryWhere ID     This is your Care EveryWhere ID. This could be used by other organizations to access your [place] medical records  [id number]        Your Vitals Were     Pulse Temperature Respirations Pulse Oximetry BMI (Body Mass Index)       90 98.4  F (36.9  C) (Oral) 18 97% 38.77 kg/m2        Blood Pressure from Last 3 Encounters:   06/01/17 119/80   12/27/16 128/77   11/16/16 119/60    Weight from Last 3 Encounters:   06/01/17 (!) 137 kg (302 lb)   05/25/17 (!) 137 kg (302 lb)   05/04/17 (!) 137 kg (302 lb)                 Today's Medication Changes          These changes are accurate as of: 6/1/17  2:15 PM.  If  you have any questions, ask your nurse or doctor.               Start taking these medicines.        Dose/Directions    albuterol 108 (90 BASE) MCG/ACT Inhaler   Commonly known as:  PROAIR HFA/PROVENTIL HFA/VENTOLIN HFA   Used for:  Cough   Started by:  Christopher Gaytan MD        Dose:  2 puff   Inhale 2 puffs into the lungs every 6 hours as needed for shortness of breath / dyspnea or wheezing   Quantity:  1 Inhaler   Refills:  3       azithromycin 250 MG tablet   Commonly known as:  ZITHROMAX   Used for:  Cough   Started by:  Christopher Gaytan MD        Two tablets first day, then one tablet daily for four days.   Quantity:  6 tablet   Refills:  0         These medicines have changed or have updated prescriptions.        Dose/Directions    amLODIPine 10 MG tablet   Commonly known as:  NORVASC   This may have changed:  additional instructions   Used for:  Essential hypertension, benign   Changed by:  Christopher Gaytan MD        Dose:  10 mg   Take 1 tablet (10 mg) by mouth daily   Quantity:  90 tablet   Refills:  3            Where to get your medicines      These medications were sent to [facility] - [place], [place] - [address]  [address], [place] [address]     Phone:  [phone]     albuterol 108 (90 BASE) MCG/ACT Inhaler    amLODIPine 10 MG tablet    azithromycin 250 MG tablet    lisinopril 40 MG tablet                Primary Care Provider Office Phone # Fax #    Christopher Gaytan [phone]       PRIMARY CARE CENTER [address] [place] [id number]        Thank you!     Thank you for choosing [facility]  for your care. Our goal is always to provide you with excellent care. Hearing back from our patients is one way we can continue to improve our services. Please take a few minutes to complete the written survey that you may receive in the mail after your visit with us. Thank you!             Your Updated Medication List -  Protect others around you: Learn how to safely use, store and throw away your medicines at www.disposemymeds.org.          This list is accurate as of: 6/1/17  2:15 PM.  Always use your most recent med list.                   Brand Name Dispense Instructions for use    albuterol 108 (90 BASE) MCG/ACT Inhaler    PROAIR HFA/PROVENTIL HFA/VENTOLIN HFA    1 Inhaler    Inhale 2 puffs into the lungs every 6 hours as needed for shortness of breath / dyspnea or wheezing       amLODIPine 10 MG tablet    NORVASC    90 tablet    Take 1 tablet (10 mg) by mouth daily       azithromycin 250 MG tablet    ZITHROMAX    6 tablet    Two tablets first day, then one tablet daily for four days.       busPIRone 10 MG tablet    BUSPAR         guaiFENesin-codeine 100-10 MG/5ML Soln solution    ROBITUSSIN AC    120 mL    Take 5 mLs by mouth every 4 hours as needed for cough       latanoprost 0.005 % ophthalmic solution    XALATAN    1 Bottle    Place 1 drop into both eyes At Bedtime       lisinopril 40 MG tablet    PRINIVIL/ZESTRIL    90 tablet    Take 1 tablet (40 mg) by mouth daily       methylphenidate 10 MG tablet    RITALIN         ofloxacin 0.3 % ophthalmic solution    OCUFLOX         ofloxacin 0.3 % otic solution    FLOXIN    10 mL    Place 3gtts into affected ear TID x 7 days, then BID x 7 days, then DAILY x 7 days, then stop       omeprazole 40 MG capsule    priLOSEC    90 capsule    Take 1 capsule (40 mg) by mouth daily       * order for DME      Use your CPAP device as directed by your provider.       * order for DME     2 Device    Equipment being ordered: double strap ankle brace- XL for right and left ankle       * order for DME     1 Device    Equipment being ordered: [employer] brace, LEFT, XL, $28       * timolol 0.5 % ophthalmic solution    TIMOPTIC    1 Bottle    Place 1 drop into both eyes daily       * timolol 0.5 % ophthalmic gel-form    TIMOPTIC-XE    5 mL    Place 1 drop into both eyes daily       traZODone 50 MG tablet     DESYREL         * venlafaxine 150 MG 24 hr capsule    EFFEXOR-XR         * venlafaxine 75 MG 24 hr capsule    EFFEXOR-XR         * Notice:  This list has 7 medication(s) that are the same as other medications prescribed for you. Read the directions carefully, and ask your doctor or other care provider to review them with you.

## 2017-06-01 NOTE — PATIENT INSTRUCTIONS
[facility] Medication Refill Request Information:  * Please contact your pharmacy regarding ANY request for medication refills.  ** [facility] Prescription Fax = [phone]  * Please allow 3 business days for routine medication refills.  * Please allow 5 business days for controlled substance medication refills.     [facility] Test Result notification information:  *You will be notified with in 7-10 days of your appointment day regarding the results of your test.  If you are on MyChart you will be notified as soon as the provider has reviewed the results and signed off on them.    [facility] [phone]

## 2017-06-29 ENCOUNTER — OFFICE VISIT (OUTPATIENT)
Dept: OTOLARYNGOLOGY | Facility: CLINIC | Age: 52
End: 2017-06-29

## 2017-06-29 DIAGNOSIS — C76.0 ADENOID CYSTIC CARCINOMA OF HEAD AND NECK (H): Primary | ICD-10-CM

## 2017-06-29 NOTE — LETTER
6/29/2017       RE: Luiz Ballesteros  [address] [place] [address]     Dear Colleague,    Thank you for referring your patient, Luiz Ballesteros, to the [facility] at [facility]. Please see a copy of my visit note below.    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       TREATMENT:  The patient underwent transnasal endoscopic resection on 02/02/2015.  He received postoperative adjuvant proton beam radiation therapy and [place] [place] [facility]  He finished treatment on 05/20/2015    HISTORY OF PRESENT ILLNESS: 52 year old male with diagnosis and treatment described above. He is here for a follow-up and sinonasal debridement. He is doing well. No new symptoms.    PHYSICAL EXAMINATION:    Constitutional: The patient was well-groomed and in no acute distress.    Skin: Warm and pink.    Neurologic: Alert and oriented x3. Cranial nerves III-XII within normal limits. Voice quality is normal.    Psychiatric: The patient's affect was calm, cooperative and appropriate.    Respiratory: Breathing comfortably without stridor or exertion of accessory muscles.    Eyes: Pupils were equal and reactive. Extraocular movements are intact.    Head: Normocephalic and atraumatic. No lesions or scars.    Ears: External auditory canals and tympanic membranes were clear.    Nose: Sinuses were nontender. Anterior rhinoscopy revealed midline septum and absence of purulence or polyps.    Oral cavity/Oropharynx: Normal tongue, floor of mouth, buccal mucosa and palate. No lesions or masses on inspection or palpation. No abnormal lymph tissue in the oropharynx.    Neck: The parotids are soft without masses. Supple with normal laryngeal and tracheal landmarks.    Lymphatic: There is no palpable lymphadenopathy or other masses in the neck or parotids.      PROCEDURE: Nasal endoscopy and debridement: Patients nose sprayed with  lidocaine. Zero degree nasal endoscope used to gain access into the nasal cavity. Crust removed from the right maxillary sinus and region of the right pterygo-palatine fossa. There is still exposed bone at the orbital process of the palatine bone. Still some granulation, paillomatous tissue around the exposed bone. This is the same as before.    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 Patient is doing well, stable exposed bone in sinonasal cavity. Patient to see us back after MRI in August.          Again, thank you for allowing me to participate in the care of your patient.      Sincerely,    Raghav Faustin MD

## 2017-06-29 NOTE — MR AVS SNAPSHOT
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)    [address]  4th Floor  [facility] [id number] [phone]            Aug 10, 2017 10:00 AM CDT   (Arrive by 9:45 AM)   MR ORBIT FACE NECK W/O & W CONTRAST with [address] MRI ([facility])    [address]  1st Floor  [facility] [id number] [phone]           Take your medicines as usual, unless your doctor tells you not to. Bring a list of your current medicines to your exam (including vitamins, minerals and over-the-counter drugs).  You will be given intravenous contrast for this exam. To prepare:   The day before your exam, drink extra fluids at least six 8-ounce glasses (unless your doctor tells you to restrict your fluids).   Have a blood test (creatinine test) within 30 days of your exam. Go to your clinic or Diagnostic Imaging Department for this test.  The MRI machine uses a strong magnet. Please wear clothes without metal (snaps, zippers). A sweatsuit works well, or we may give you a hospital gown.  Please remove any body piercings and hair extensions before you arrive. You will also remove watches, jewelry, hairpins, wallets, dentures, partial dental plates and hearing aids. You may wear contact lenses, and you may be able to wear your rings. We have a safe place to keep your personal items, but it is  safer to leave them at home.   **IMPORTANT** THE INSTRUCTIONS BELOW ARE ONLY FOR THOSE PATIENTS WHO HAVE BEEN TOLD THEY WILL RECEIVE SEDATION OR GENERAL ANESTHESIA DURING THEIR MRI PROCEDURE:  IF YOU WILL RECEIVE SEDATION (take medicine to help you relax during your exam):   You must get the medicine from your doctor before you arrive. Bring the medicine to the exam. Do not take it at home.   Arrive one hour early. Bring someone who can take you home after the test. Your medicine will make you sleepy. After the exam, you may not drive, take a bus or take a taxi by yourself.   No eating 8 hours before your exam. You may have clear liquids up until 4 hours before your exam. (Clear liquids include water, clear tea, black coffee and fruit juice without pulp.)  IF YOU WILL RECEIVE ANESTHESIA (be asleep for your exam):   Arrive 1 1/2 hours early. Bring someone who can take you home after the test. You may not drive, take a bus or take a taxi by yourself.   No eating 8 hours before your exam. You may have clear liquids up until 4 hours before your exam. (Clear liquids include water, clear tea, black coffee and fruit juice without pulp.)  Please call the Imaging Department at your exam site with any questions.            Nov 07, 2017  9:45 AM CST   VISUAL FIELD with [facility] EYE VISUAL FIELD   Eye Clinic ([facility])    Joe Glover  [address]   [facility] [address] [place] [id number]   [phone]            Nov 07, 2017 10:15 AM CST   RETURN GLAUCOMA with Jennyfer Mccall MD   Eye Clinic ([facility])    Joe Banerjeeg  [address]   [facility] [address] [place] [id number]   [phone]            Nov 13, 2017 12:30 PM CST   RETURN RETINA with Lindsey Perez MD   Eye Clinic ([facility])    Joe Glover  [address]   [facility] [address] [place] [id number]   [phone]              Who to contact     Please call your clinic at  [phone] to:    Ask questions about your health    Make or cancel appointments    Discuss your medicines    Learn about your test results    Speak to your doctor   If you have compliments or concerns about an experience at your clinic, or if you wish to file a complaint, please contact [facility] Patient Relations at [phone] or email us at [email or web address]         Additional Information About Your Visit        [employer] Information     [employer] gives you secure access to your electronic health record. If you see a primary care provider, you can also send messages to your care team and make appointments. If you have questions, please call your primary care clinic.  If you do not have a primary care provider, please call [phone] and they will assist you.      [employer] is an electronic gateway that provides easy, online access to your medical records. With [employer], you can request a clinic appointment, read your test results, renew a prescription or communicate with your care team.     To access your existing account, please contact your [facility] or call [phone] for assistance.        Care EveryWhere ID     This is your Care EveryWhere ID. This could be used by other organizations to access your [place] medical records  [id number]         Blood Pressure from Last 3 Encounters:   06/01/17 119/80   12/27/16 128/77   11/16/16 119/60    Weight from Last 3 Encounters:   06/01/17 (!) 137 kg (302 lb)   05/25/17 (!) 137 kg (302 lb)   05/04/17 (!) 137 kg (302 lb)              We Performed the Following     NASAL/SINUS SCOPE WITH BIOPSY/POLYPECT/DEBRIDE,ENT        Primary Care Provider Office Phone # Fax #    Christopher Gaytan [phone]       [facility] [address] [place] [address]        Equal Access to Services     CAROLE MARTINEZ : holden Powers qaybta  kaleigh martinkamryn moramercy [facility] [phone].    ATENCIÓN: Si felice welsh, tiene a justice disposición servicios gratuitos de asistencia lingüística. Viraj al [phone].    We comply with applicable federal civil rights laws and [place] laws. We do not discriminate on the basis of race, color, national origin, age, disability sex, sexual orientation or gender identity.            Thank you!     Thank you for choosing [facility]  for your care. Our goal is always to provide you with excellent care. Hearing back from our patients is one way we can continue to improve our services. Please take a few minutes to complete the written survey that you may receive in the mail after your visit with us. Thank you!             Your Updated Medication List - Protect others around you: Learn how to safely use, store and throw away your medicines at www.disposemymeds.org.          This list is accurate as of: 6/29/17  3:14 PM.  Always use your most recent med list.                   Brand Name Dispense Instructions for use Diagnosis    albuterol 108 (90 BASE) MCG/ACT Inhaler    PROAIR HFA/PROVENTIL HFA/VENTOLIN HFA    1 Inhaler    Inhale 2 puffs into the lungs every 6 hours as needed for shortness of breath / dyspnea or wheezing    Cough       amLODIPine 10 MG tablet    NORVASC    90 tablet    Take 1 tablet (10 mg) by mouth daily    Essential hypertension, benign       azithromycin 250 MG tablet    ZITHROMAX    6 tablet    Two tablets first day, then one tablet daily for four days.    Cough       busPIRone 10 MG tablet    BUSPAR          guaiFENesin-codeine 100-10 MG/5ML Soln solution    ROBITUSSIN AC    120 mL    Take 5 mLs by mouth every 4 hours as needed for cough        latanoprost 0.005 % ophthalmic solution    XALATAN    1 Bottle    Place 1 drop into both eyes At Bedtime    Primary open angle glaucoma of both eyes, mild stage       lisinopril 40 MG tablet     PRINIVIL/ZESTRIL    90 tablet    Take 1 tablet (40 mg) by mouth daily    Essential hypertension       methylphenidate 10 MG tablet    RITALIN          ofloxacin 0.3 % ophthalmic solution    OCUFLOX          ofloxacin 0.3 % otic solution    FLOXIN    10 mL    Place 3gtts into affected ear TID x 7 days, then BID x 7 days, then DAILY x 7 days, then stop    Otorrhea of right ear       omeprazole 40 MG capsule    priLOSEC    90 capsule    Take 1 capsule (40 mg) by mouth daily    Gastroesophageal reflux disease without esophagitis       * order for DME      Use your CPAP device as directed by your provider.        * order for DME     2 Device    Equipment being ordered: double strap ankle brace- XL for right and left ankle    Achilles tendinitis of both lower extremities, Shin splints, unspecified laterality, initial encounter, Ankle instability, unspecified laterality       * order for DME     1 Device    Equipment being ordered: [facility] brace, LEFT, XL, $28    Bilateral thumb pain       * timolol 0.5 % ophthalmic solution    TIMOPTIC    1 Bottle    Place 1 drop into both eyes daily    POAG (primary open-angle glaucoma), moderate stage       * timolol 0.5 % ophthalmic gel-form    TIMOPTIC-XE    5 mL    Place 1 drop into both eyes daily    Primary open angle glaucoma of both eyes, indeterminate stage       traZODone 50 MG tablet    DESYREL          * venlafaxine 150 MG 24 hr capsule    EFFEXOR-XR          * venlafaxine 75 MG 24 hr capsule    EFFEXOR-XR          * Notice:  This list has 7 medication(s) that are the same as other medications prescribed for you. Read the directions carefully, and ask your doctor or other care provider to review them with you.

## 2017-06-29 NOTE — PROGRESS NOTES
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       TREATMENT:  The patient underwent transnasal endoscopic resection on 02/02/2015.  He [place] postoperative adjuvant [place] beam [place] [facility]  He finished treatment on 05/20/2015    HISTORY OF PRESENT ILLNESS: 52 year old male with diagnosis and treatment described above. He is here for a follow-up and sinonasal debridement. He is doing well. No new symptoms.    PHYSICAL EXAMINATION:    Constitutional: The patient was well-groomed and in no acute distress.    Skin: Warm and pink.    Neurologic: Alert and oriented x3. Cranial nerves III-XII within normal limits. Voice quality is normal.    Psychiatric: The patient's affect was calm, cooperative and appropriate.    Respiratory: Breathing comfortably without stridor or exertion of accessory muscles.    Eyes: Pupils were equal and reactive. Extraocular movements are intact.    Head: Normocephalic and atraumatic. No lesions or scars.    Ears: External auditory canals and tympanic membranes were clear.    Nose: Sinuses were nontender. Anterior rhinoscopy revealed midline septum and absence of purulence or polyps.    Oral cavity/Oropharynx: Normal tongue, floor of mouth, buccal mucosa and palate. No lesions or masses on inspection or palpation. No abnormal lymph tissue in the oropharynx.    Neck: The parotids are soft without masses. Supple with normal laryngeal and tracheal landmarks.    Lymphatic: There is no palpable lymphadenopathy or other masses in the neck or parotids.      PROCEDURE: Nasal endoscopy and debridement: Patients nose sprayed with lidocaine. Zero degree nasal endoscope used to gain access into the nasal cavity. Crust removed from the right maxillary sinus and region of the right pterygo-palatine fossa. There is still exposed bone at the orbital process of the palatine bone. Still some granulation, paillomatous tissue around the  exposed bone. This is the same as before.    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 Patient is doing well, stable exposed bone in sinonasal cavity. Patient to see us back after MRI in August.

## 2017-06-29 NOTE — NURSING NOTE
Chief Complaint   Patient presents with     RECHECK     right sinonasal skull     Shanna Arora Medical Assistant

## 2017-10-03 DIAGNOSIS — H40.1130 PRIMARY OPEN ANGLE GLAUCOMA OF BOTH EYES, UNSPECIFIED GLAUCOMA STAGE: Primary | ICD-10-CM

## 2017-10-03 RX ORDER — LATANOPROST 50 UG/ML
1 SOLUTION/ DROPS OPHTHALMIC AT BEDTIME
Qty: 2.5 ML | Refills: 2 | Status: SHIPPED | OUTPATIENT
Start: 2017-10-03 | End: 2017-12-31

## 2017-10-04 DIAGNOSIS — H40.1134 PRIMARY OPEN ANGLE GLAUCOMA OF BOTH EYES, INDETERMINATE STAGE: ICD-10-CM

## 2017-10-04 NOTE — TELEPHONE ENCOUNTER
timolol (TIMOPTIC-XE) 0.5 % ophthalmic gel-forming  Last Written Prescription Date:  11/26/16  Last Fill Quantity: 5ml,   # refills: 11  Last Office Visit with [facility] or [facility] prescribing provider: 5/15/17  Future Office visit:   11/7/17        Fazal Caldwell MD   Ophthalmology    H/O RD (retinal detachment) +1 more   Dx    Follow Up For ; Referred by ESTABLISHED PATIENT   Reason for visit    Progress Notes      CC: follow-up visit     Interval History: stable vision, no changes in floaters.     HPI:  51 yo male s/p RD repair ou.  Feels that vision has been stable since last fall. Follows with Dr Mccall for glaucoma.     OCT Macula (March 2017)  RE: normal  LE: noraml     Assessment/plan   1.  S/P RD repair both eyes                          No retinal tear or hole today on exam                        [facility] improved OU today from previous                        Monitor, return to clinic 6 months or as needed      2. Primary open angle glaucoma (POAG) both eyes                         - Follows with Dr. Mccall                        - continue QAM timolol and QHS xalatan     3.  S/p proton beam for adenocystic CA in sinus                        - has completed radiation/surgeries to date, no further treatments at present     Return to clinic 6-12 months           routed because:   timolol (TIMOPTIC-XE) 0.5 % ophthalmic gel-forming. high med alert

## 2017-10-05 RX ORDER — TIMOLOL MALEATE 5 MG/ML
SOLUTION OPHTHALMIC
Qty: 15 ML | Refills: 1 | Status: SHIPPED | OUTPATIENT
Start: 2017-10-05

## 2018-10-09 DIAGNOSIS — R05.9 COUGH: ICD-10-CM

## 2018-10-09 NOTE — TELEPHONE ENCOUNTER
Pro air     Last Written Prescription Date:  6/1/17  Last Fill Quantity: 1 # refills: 3  Last Office Visit : 6/1/17  Future Office visit:  No future appt    Routing refill request to RN for review/approval because:  Drug failed the [facility], [facility] or [facility] refill protocol   Scheduling has been notified to contact the pt for appointment.

## 2018-10-10 RX ORDER — ALBUTEROL SULFATE 90 UG/1
2 AEROSOL, METERED RESPIRATORY (INHALATION) EVERY 6 HOURS PRN
Qty: 1 INHALER | Refills: 0 | Status: SHIPPED | OUTPATIENT
Start: 2018-10-10

## 2018-10-10 RX ORDER — ALBUTEROL SULFATE 90 UG/1
AEROSOL, METERED RESPIRATORY (INHALATION)
Qty: 8.5 G | Refills: 0 | OUTPATIENT
Start: 2018-10-10

## 2018-10-10 NOTE — TELEPHONE ENCOUNTER
----- Message from Samantha Luciano RN sent at 10/10/2018 12:26 PM CDT -----  Regarding: scheduling  Please call to schedule. Pt of Dr. Son    Patient is overdue for annual clinic visit - unless otherwise indicated patient needs to be scheduled with 1st available.    Patient may need labs and or imaging - please check with RN care coordinator.    Thanks,  MRT    I do not need any follow up on the scheduling of this appointment unless the patient will no longer be receiving care in our clinic.

## 2018-10-10 NOTE — TELEPHONE ENCOUNTER
Albuterol inhaler     Last Written Prescription Date:  6-1-17  Last Fill Quantity: 1 inhaler,   # refills: 3  Last Office Visit : 6-1-17  Future Office visit:  none    Routing refill request to provider for review/approval because:   High Drug warning: Timolol and Abuterol     Over due for appt:Scheduling has been notified to contact the pt for appointment.

## 2018-10-10 NOTE — TELEPHONE ENCOUNTER
Called patient. No answered. Left a message to call back to schedule annual clinic visit with PCP and gave number.

## 2018-10-12 ENCOUNTER — TELEPHONE (OUTPATIENT)
Dept: INTERNAL MEDICINE | Facility: CLINIC | Age: 53
End: 2018-10-12

## 2018-10-12 NOTE — TELEPHONE ENCOUNTER
[facility]    Phone Message    May a detailed message be left on voicemail: yes    Reason for Call: Other: Pt is getting calls to remind him to make his yearly appt. He wanted to let us know [place] and is no longer a pt here.      Action Taken: Message routed to:  [facility] ([facility] AGNES

## 2019-10-02 ENCOUNTER — HEALTH MAINTENANCE LETTER (OUTPATIENT)
Age: 54
End: 2019-10-02

## 2020-02-21 ENCOUNTER — TELEPHONE (OUTPATIENT)
Dept: PULMONOLOGY | Facility: CLINIC | Age: 55
End: 2020-02-21

## 2020-02-21 NOTE — TELEPHONE ENCOUNTER
Reason for call:  Other   Patient called regarding (reason for call): call back    Additional comments: per nurse from [place], she sent over a request for the sleep study and they only received chart notes. They need the sleep study information so the patient can receive supplies.      Phone number to reach patient:  Home number on file [phone] (home)    Best Time:  Anytime     Can we leave a detailed message on this number?  NO    Travel screening: Not Applicable

## 2020-02-24 NOTE — TELEPHONE ENCOUNTER
Pt called and message left informing pt that he will need to contact medical records to get copies of studies done.    MARY JO Tan

## 2021-01-15 ENCOUNTER — HEALTH MAINTENANCE LETTER (OUTPATIENT)
Age: 56
End: 2021-01-15

## 2021-09-04 ENCOUNTER — HEALTH MAINTENANCE LETTER (OUTPATIENT)
Age: 56
End: 2021-09-04

## 2022-02-19 ENCOUNTER — HEALTH MAINTENANCE LETTER (OUTPATIENT)
Age: 57
End: 2022-02-19

## 2022-10-22 ENCOUNTER — HEALTH MAINTENANCE LETTER (OUTPATIENT)
Age: 57
End: 2022-10-22

## 2023-04-01 ENCOUNTER — HEALTH MAINTENANCE LETTER (OUTPATIENT)
Age: 58
End: 2023-04-01

## (undated) RX ORDER — LIDOCAINE HYDROCHLORIDE AND EPINEPHRINE 10; 10 MG/ML; UG/ML
INJECTION, SOLUTION INFILTRATION; PERINEURAL
Status: DISPENSED
Start: 2017-04-20